# Patient Record
Sex: FEMALE | Race: OTHER | NOT HISPANIC OR LATINO | Employment: PART TIME | URBAN - METROPOLITAN AREA
[De-identification: names, ages, dates, MRNs, and addresses within clinical notes are randomized per-mention and may not be internally consistent; named-entity substitution may affect disease eponyms.]

---

## 2019-08-12 ENCOUNTER — APPOINTMENT (EMERGENCY)
Dept: RADIOLOGY | Facility: HOSPITAL | Age: 29
End: 2019-08-12

## 2019-08-12 ENCOUNTER — HOSPITAL ENCOUNTER (EMERGENCY)
Facility: HOSPITAL | Age: 29
Discharge: HOME | End: 2019-08-12
Attending: EMERGENCY MEDICINE

## 2019-08-12 VITALS
BODY MASS INDEX: 46.65 KG/M2 | RESPIRATION RATE: 20 BRPM | HEIGHT: 65 IN | OXYGEN SATURATION: 99 % | DIASTOLIC BLOOD PRESSURE: 59 MMHG | TEMPERATURE: 98.1 F | SYSTOLIC BLOOD PRESSURE: 132 MMHG | WEIGHT: 280 LBS | HEART RATE: 81 BPM

## 2019-08-12 DIAGNOSIS — M25.561 PAIN AND SWELLING OF RIGHT KNEE: Primary | ICD-10-CM

## 2019-08-12 DIAGNOSIS — M25.461 PAIN AND SWELLING OF RIGHT KNEE: Primary | ICD-10-CM

## 2019-08-12 PROCEDURE — 96372 THER/PROPH/DIAG INJ SC/IM: CPT

## 2019-08-12 PROCEDURE — 73560 X-RAY EXAM OF KNEE 1 OR 2: CPT

## 2019-08-12 PROCEDURE — 99283 EMERGENCY DEPT VISIT LOW MDM: CPT

## 2019-08-12 PROCEDURE — 99284 EMERGENCY DEPT VISIT MOD MDM: CPT | Performed by: EMERGENCY MEDICINE

## 2019-08-12 RX ORDER — KETOROLAC TROMETHAMINE 30 MG/ML
15 INJECTION, SOLUTION INTRAMUSCULAR; INTRAVENOUS ONCE
Status: COMPLETED | OUTPATIENT
Start: 2019-08-12 | End: 2019-08-12

## 2019-08-12 RX ORDER — ACETAMINOPHEN 325 MG/1
975 TABLET ORAL ONCE
Status: COMPLETED | OUTPATIENT
Start: 2019-08-12 | End: 2019-08-12

## 2019-08-12 RX ADMIN — ACETAMINOPHEN 975 MG: 325 TABLET ORAL at 07:36

## 2019-08-12 RX ADMIN — KETOROLAC TROMETHAMINE 15 MG: 30 INJECTION, SOLUTION INTRAMUSCULAR at 07:38

## 2019-08-12 NOTE — ED ATTENDING ATTESTATION
Lou Omer DO, saw and evaluated the patient  I have discussed the patient with the resident/non-physician practitioner and agree with the resident's/non-physician practitioner's findings, Plan of Care, and MDM as documented in the resident's/non-physician practitioner's note, except where noted  All available labs and Radiology studies were reviewed  I was present for key portions of any procedure(s) performed by the resident/non-physician practitioner and I was immediately available to provide assistance  At this point I agree with the current assessment done in the Emergency Department  I have conducted an independent evaluation of this patient a history and physical is as follows:    34 yof with re-injury of right knee three days ago while dancing at a party  She has had knee pain for 6-8 months  /59   Pulse 81   Temp 98 1 °F (36 7 °C) (Oral)   Resp 20   Ht 5' 5" (1 651 m)   Wt 127 kg (280 lb)   SpO2 99%   BMI 46 59 kg/m²   NAD, A&Ox4  r knee w suprapatellar TTP and NROM, no erythema/ecchymosis; NVI  Pt declined knee immobilizer and crutches       XR, pain control, f/u sports        Critical Care Time  Procedures

## 2019-08-12 NOTE — ED PROVIDER NOTES
History  Chief Complaint   Patient presents with    Knee Pain     patient c/o b/l knee pain specifically right knee  Patient states she was on her feet a lot this weekend  HPI    29yo female presents for evaluation of right knee pain  Patient says she injured her knee about 8 months ago and has had chronic pain since then  She notes not seeing a doctor at the time and has not been taking anything for pain since then  She notes this weekend she may have injured it again while dancing 3 days ago, notes increase pain and swelling  Patient localizes pain to above the knee and notes pain is worst on flexion of the knee  She notes she limps when she walks  Denies actual fall  Denies history of DVT or recent travel  None       History reviewed  No pertinent past medical history  History reviewed  No pertinent surgical history  History reviewed  No pertinent family history  I have reviewed and agree with the history as documented  Social History     Tobacco Use    Smoking status: Current Every Day Smoker    Smokeless tobacco: Never Used   Substance Use Topics    Alcohol use: Yes    Drug use: Yes     Types: Marijuana        Review of Systems   Constitutional: Negative for chills, diaphoresis, fatigue and fever  HENT: Negative for congestion, rhinorrhea and sore throat  Eyes: Negative for photophobia and visual disturbance  Respiratory: Negative for cough, chest tightness and shortness of breath  Cardiovascular: Negative for chest pain and palpitations  Gastrointestinal: Negative for abdominal pain, blood in stool, constipation, diarrhea, nausea and vomiting  Genitourinary: Negative for decreased urine volume, dysuria, frequency and hematuria  Musculoskeletal: Positive for gait problem and joint swelling  Negative for back pain, myalgias, neck pain and neck stiffness  Skin: Negative for pallor and rash     Neurological: Negative for weakness, light-headedness, numbness and headaches  Hematological: Negative for adenopathy  Does not bruise/bleed easily  All other systems reviewed and are negative  Physical Exam  ED Triage Vitals [08/12/19 0638]   Temperature Pulse Respirations Blood Pressure SpO2   98 1 °F (36 7 °C) 81 20 132/59 99 %      Temp Source Heart Rate Source Patient Position - Orthostatic VS BP Location FiO2 (%)   Oral Monitor -- -- --      Pain Score       8             Orthostatic Vital Signs  Vitals:    08/12/19 0638   BP: 132/59   Pulse: 81       Physical Exam   Constitutional: She is oriented to person, place, and time  She appears well-developed and well-nourished  No distress  Patient is alert and oriented, appears well and nontoxic, in no acute distress   HENT:   Head: Normocephalic and atraumatic  Mouth/Throat: Oropharynx is clear and moist  No oropharyngeal exudate  Eyes: Pupils are equal, round, and reactive to light  Conjunctivae and EOM are normal    Neck: Normal range of motion  Neck supple  Cardiovascular: Normal rate, regular rhythm, normal heart sounds and intact distal pulses  Pulmonary/Chest: Effort normal and breath sounds normal  No respiratory distress  Abdominal: Soft  Bowel sounds are normal  She exhibits no distension  There is no tenderness  Musculoskeletal: She exhibits tenderness  She exhibits no deformity  Mild edema noted of supra-patella, no erythema or warmth to touch, able to range knee but with pain on full flexion, no hip or foot pain, good strength of extremity, sensation and pulse intact   Lymphadenopathy:     She has no cervical adenopathy  Neurological: She is alert and oriented to person, place, and time  No facial asymmetry noted, CN 2-12 intact, full ROM of upper and lower extremities, muscle strength 5/5 throughout, DTRs normal, sensation intact throughout, negative finger to nose/Yajaira, negative Romberg's, negative Babinski, no gait disturbance noted   Skin: Skin is warm and dry   Capillary refill takes less than 2 seconds  No rash noted  She is not diaphoretic  No erythema  No pallor  Psychiatric: She has a normal mood and affect  Her behavior is normal  Judgment and thought content normal    Nursing note and vitals reviewed  ED Medications  Medications   acetaminophen (TYLENOL) tablet 975 mg (975 mg Oral Given 8/12/19 0736)   ketorolac (TORADOL) injection 15 mg (15 mg Intramuscular Given 8/12/19 0703)       Diagnostic Studies  Results Reviewed     None                 XR knee 1 or 2 vw right   Final Result by Agustín Jaramillo MD (08/12 2286)      No acute osseous abnormality  Workstation performed: [id number]               Procedures  Procedures        ED Course  ED Course as of Aug 12 1129   [facility] Aug 12, 2019   3988 On re-evaluation, patient is sleeping, easily arousable  She says she feels better, flexing knee on her own        9555 Patient declined immobilizer and crutches  Will ace wrap and provide info to sports medicine clinic as well as [facility] for pcp f/u  MDM  Number of Diagnoses or Management Options  Pain and swelling of right knee:   Diagnosis management comments: 29yo female presents for evaluation of right knee pain  Patient appears well and is hemodynamically stable  Will obtain right knee xray and order tylenol and toradol         Disposition  Final diagnoses:   Pain and swelling of right knee     Time reflects when diagnosis was documented in both MDM as applicable and the Disposition within this note     Time User Action Codes Description Comment    8/12/2019  8:22 AM [place] Add [M25 561] Right knee pain     8/12/2019  8:22 AM [place] Add [M25 561,  M25 461] Pain and swelling of right knee     8/12/2019  8:22 AM [place] Modify [E74 943,  M25 461] Pain and swelling of right knee     8/12/2019  8:22 AM [place] Remove [M25 561] Right knee pain       ED Disposition     ED Disposition Condition Date/Time Comment    Discharge Good Mon Aug 12, 2019  8:21 AM Nallely Mendez discharge to home/self care  Follow-up Information     Follow up With Specialties Details Why Contact Info Additional Information    [facility]  Schedule an appointment as soon as possible for a visit   [address] [facility] Schedule an appointment as soon as possible for a visit   [address] [facility] [address] [phone]  [address] [place] [address], [phone]          There are no discharge medications for this patient  No discharge procedures on file  ED Provider  Attending physically available and evaluated Nallely Mendez I managed the patient along with the ED Attending      Electronically Signed by         Sha Acuna MD  08/12/19 6349

## 2019-11-12 ENCOUNTER — HOSPITAL ENCOUNTER (EMERGENCY)
Facility: HOSPITAL | Age: 29
Discharge: HOME | End: 2019-11-12
Attending: EMERGENCY MEDICINE

## 2019-11-12 ENCOUNTER — APPOINTMENT (EMERGENCY)
Dept: RADIOLOGY | Facility: HOSPITAL | Age: 29
End: 2019-11-12

## 2019-11-12 VITALS
TEMPERATURE: 98.1 F | DIASTOLIC BLOOD PRESSURE: 85 MMHG | BODY MASS INDEX: 52.09 KG/M2 | HEART RATE: 99 BPM | SYSTOLIC BLOOD PRESSURE: 147 MMHG | OXYGEN SATURATION: 96 % | RESPIRATION RATE: 18 BRPM | WEIGHT: 293 LBS

## 2019-11-12 DIAGNOSIS — K08.89 PAIN, DENTAL: ICD-10-CM

## 2019-11-12 DIAGNOSIS — J40 BRONCHITIS: Primary | ICD-10-CM

## 2019-11-12 PROCEDURE — 71046 X-RAY EXAM CHEST 2 VIEWS: CPT

## 2019-11-12 PROCEDURE — 99283 EMERGENCY DEPT VISIT LOW MDM: CPT

## 2019-11-12 PROCEDURE — 99284 EMERGENCY DEPT VISIT MOD MDM: CPT | Performed by: PHYSICIAN ASSISTANT

## 2019-11-12 RX ORDER — ALBUTEROL SULFATE 90 UG/1
2 AEROSOL, METERED RESPIRATORY (INHALATION) ONCE
Status: COMPLETED | OUTPATIENT
Start: 2019-11-12 | End: 2019-11-12

## 2019-11-12 RX ORDER — AMOXICILLIN 500 MG/1
500 CAPSULE ORAL 3 TIMES DAILY
Qty: 21 CAPSULE | Refills: 0 | Status: SHIPPED | OUTPATIENT
Start: 2019-11-12 | End: 2019-11-19

## 2019-11-12 RX ADMIN — ALBUTEROL SULFATE 2 PUFF: 90 AEROSOL, METERED RESPIRATORY (INHALATION) at 16:18

## 2019-12-01 ENCOUNTER — HOSPITAL ENCOUNTER (EMERGENCY)
Facility: HOSPITAL | Age: 29
Discharge: HOME | End: 2019-12-01
Attending: EMERGENCY MEDICINE | Admitting: EMERGENCY MEDICINE

## 2019-12-01 ENCOUNTER — APPOINTMENT (EMERGENCY)
Dept: RADIOLOGY | Facility: HOSPITAL | Age: 29
End: 2019-12-01

## 2019-12-01 VITALS
HEART RATE: 86 BPM | BODY MASS INDEX: 52.09 KG/M2 | WEIGHT: 293 LBS | DIASTOLIC BLOOD PRESSURE: 53 MMHG | OXYGEN SATURATION: 99 % | TEMPERATURE: 98.6 F | RESPIRATION RATE: 18 BRPM | SYSTOLIC BLOOD PRESSURE: 112 MMHG

## 2019-12-01 DIAGNOSIS — N94.6 DYSMENORRHEA: Primary | ICD-10-CM

## 2019-12-01 LAB
ALBUMIN SERPL BCP-MCNC: 3.4 G/DL (ref 3.5–5)
ALP SERPL-CCNC: 61 U/L (ref 46–116)
ALT SERPL W P-5'-P-CCNC: 17 U/L (ref 12–78)
ANION GAP SERPL CALCULATED.3IONS-SCNC: 3 MMOL/L (ref 4–13)
AST SERPL W P-5'-P-CCNC: 8 U/L (ref 5–45)
BACTERIA UR QL AUTO: ABNORMAL /HPF
BASOPHILS # BLD AUTO: 0.05 THOUSANDS/ΜL (ref 0–0.1)
BASOPHILS NFR BLD AUTO: 0 % (ref 0–1)
BILIRUB SERPL-MCNC: 0.57 MG/DL (ref 0.2–1)
BILIRUB UR QL STRIP: NEGATIVE
BUN SERPL-MCNC: 12 MG/DL (ref 5–25)
CALCIUM SERPL-MCNC: 9.1 MG/DL (ref 8.3–10.1)
CHLORIDE SERPL-SCNC: 104 MMOL/L (ref 100–108)
CLARITY UR: CLEAR
CO2 SERPL-SCNC: 30 MMOL/L (ref 21–32)
COLOR UR: YELLOW
COLOR, POC: NORMAL
CREAT SERPL-MCNC: 1.07 MG/DL (ref 0.6–1.3)
EOSINOPHIL # BLD AUTO: 0.25 THOUSAND/ΜL (ref 0–0.61)
EOSINOPHIL NFR BLD AUTO: 2 % (ref 0–6)
ERYTHROCYTE [DISTWIDTH] IN BLOOD BY AUTOMATED COUNT: 17.3 % (ref 11.6–15.1)
EXT PREG TEST URINE: NEGATIVE
EXT. CONTROL ED NAV: NORMAL
GFR SERPL CREATININE-BSD FRML MDRD: 71 ML/MIN/1.73SQ M
GLUCOSE SERPL-MCNC: 93 MG/DL (ref 65–140)
GLUCOSE UR STRIP-MCNC: NEGATIVE MG/DL
HCT VFR BLD AUTO: 42.1 % (ref 34.8–46.1)
HGB BLD-MCNC: 13.2 G/DL (ref 11.5–15.4)
HGB UR QL STRIP.AUTO: ABNORMAL
HYALINE CASTS #/AREA URNS LPF: ABNORMAL /LPF
IMM GRANULOCYTES # BLD AUTO: 0.04 THOUSAND/UL (ref 0–0.2)
IMM GRANULOCYTES NFR BLD AUTO: 0 % (ref 0–2)
KETONES UR STRIP-MCNC: NEGATIVE MG/DL
LEUKOCYTE ESTERASE UR QL STRIP: NEGATIVE
LYMPHOCYTES # BLD AUTO: 3.34 THOUSANDS/ΜL (ref 0.6–4.47)
LYMPHOCYTES NFR BLD AUTO: 26 % (ref 14–44)
MCH RBC QN AUTO: 22.4 PG (ref 26.8–34.3)
MCHC RBC AUTO-ENTMCNC: 31.4 G/DL (ref 31.4–37.4)
MCV RBC AUTO: 72 FL (ref 82–98)
MONOCYTES # BLD AUTO: 0.75 THOUSAND/ΜL (ref 0.17–1.22)
MONOCYTES NFR BLD AUTO: 6 % (ref 4–12)
NEUTROPHILS # BLD AUTO: 8.28 THOUSANDS/ΜL (ref 1.85–7.62)
NEUTS SEG NFR BLD AUTO: 66 % (ref 43–75)
NITRITE UR QL STRIP: NEGATIVE
NON-SQ EPI CELLS URNS QL MICRO: ABNORMAL /HPF
NRBC BLD AUTO-RTO: 0 /100 WBCS
PH UR STRIP.AUTO: 7 [PH] (ref 4.5–8)
PLATELET # BLD AUTO: 391 THOUSANDS/UL (ref 149–390)
PMV BLD AUTO: 10.5 FL (ref 8.9–12.7)
POTASSIUM SERPL-SCNC: 3.9 MMOL/L (ref 3.5–5.3)
PROT SERPL-MCNC: 7.9 G/DL (ref 6.4–8.2)
PROT UR STRIP-MCNC: ABNORMAL MG/DL
RBC # BLD AUTO: 5.89 MILLION/UL (ref 3.81–5.12)
RBC #/AREA URNS AUTO: ABNORMAL /HPF
SODIUM SERPL-SCNC: 137 MMOL/L (ref 136–145)
SP GR UR STRIP.AUTO: 1.01 (ref 1–1.03)
UROBILINOGEN UR QL STRIP.AUTO: 0.2 E.U./DL
WBC # BLD AUTO: 12.71 THOUSAND/UL (ref 4.31–10.16)
WBC #/AREA URNS AUTO: ABNORMAL /HPF

## 2019-12-01 PROCEDURE — 99284 EMERGENCY DEPT VISIT MOD MDM: CPT | Performed by: EMERGENCY MEDICINE

## 2019-12-01 PROCEDURE — 74177 CT ABD & PELVIS W/CONTRAST: CPT

## 2019-12-01 PROCEDURE — 85025 COMPLETE CBC W/AUTO DIFF WBC: CPT | Performed by: EMERGENCY MEDICINE

## 2019-12-01 PROCEDURE — 99284 EMERGENCY DEPT VISIT MOD MDM: CPT

## 2019-12-01 PROCEDURE — 81001 URINALYSIS AUTO W/SCOPE: CPT

## 2019-12-01 PROCEDURE — 81025 URINE PREGNANCY TEST: CPT | Performed by: EMERGENCY MEDICINE

## 2019-12-01 PROCEDURE — 96374 THER/PROPH/DIAG INJ IV PUSH: CPT

## 2019-12-01 PROCEDURE — 36415 COLL VENOUS BLD VENIPUNCTURE: CPT | Performed by: EMERGENCY MEDICINE

## 2019-12-01 PROCEDURE — 80053 COMPREHEN METABOLIC PANEL: CPT | Performed by: EMERGENCY MEDICINE

## 2019-12-01 RX ORDER — NAPROXEN 500 MG/1
500 TABLET ORAL 2 TIMES DAILY WITH MEALS
Qty: 30 TABLET | Refills: 0 | Status: SHIPPED | OUTPATIENT
Start: 2019-12-01 | End: 2020-06-22

## 2019-12-01 RX ORDER — KETOROLAC TROMETHAMINE 30 MG/ML
15 INJECTION, SOLUTION INTRAMUSCULAR; INTRAVENOUS ONCE
Status: COMPLETED | OUTPATIENT
Start: 2019-12-01 | End: 2019-12-01

## 2019-12-01 RX ADMIN — KETOROLAC TROMETHAMINE 15 MG: 30 INJECTION, SOLUTION INTRAMUSCULAR at 17:43

## 2019-12-01 RX ADMIN — IOHEXOL 100 ML: 350 INJECTION, SOLUTION INTRAVENOUS at 19:20

## 2019-12-01 NOTE — ED NOTES
Patient notified that urine sample is needed, unable to provide one at this time    Given PO fluids     Desire Gay RN  12/01/19 1817

## 2019-12-01 NOTE — ED PROVIDER NOTES
History  Chief Complaint   Patient presents with    Vaginal Bleeding     patient began with lower quadrant abdominal pain and vaginal bleeding today     Patient is a 72-year-old female presents for lower abdominal discomfort and vaginal bleeding  Patient says that she went to the bathroom when she had the acute onset of severe suprapubic abdominal discomfort  She says that it lasted for about 45 minutes  She says it was associated with dark vaginal bleeding  Patient says that she is due for her menstrual period on December 7th and says that she has always regular  She says usually starts up with light spotting and progresses from there  Patient says that this abdominal pain was on like anything that she has had with previous menstrual periods  She says it is somewhat improved now but still cramping in nature  Patient says that she smoked weed" and took Motrin for the discomfort  None       History reviewed  No pertinent past medical history  History reviewed  No pertinent surgical history  History reviewed  No pertinent family history  I have reviewed and agree with the history as documented  Social History     Tobacco Use    Smoking status: Current Every Day Smoker     Types: Cigarettes    Smokeless tobacco: Never Used   Substance Use Topics    Alcohol use: Not Currently    Drug use: Yes     Types: Marijuana        Review of Systems   Constitutional: Negative for chills, diaphoresis and fever  HENT: Negative for congestion, sinus pressure, sore throat and trouble swallowing  Eyes: Negative for pain, discharge and itching  Respiratory: Negative for cough, chest tightness, shortness of breath and wheezing  Cardiovascular: Negative for chest pain, palpitations and leg swelling  Gastrointestinal: Negative for abdominal distention, abdominal pain, blood in stool, diarrhea, nausea and vomiting  Endocrine: Negative for polyphagia and polyuria     Genitourinary: Positive for pelvic pain and vaginal bleeding  Negative for difficulty urinating, dysuria, flank pain and hematuria  Musculoskeletal: Negative for arthralgias and back pain  Skin: Negative for rash  Neurological: Negative for dizziness, syncope, weakness, light-headedness and headaches  Physical Exam  ED Triage Vitals   Temperature Pulse Respirations Blood Pressure SpO2   12/01/19 1659 12/01/19 1659 12/01/19 1659 12/01/19 1659 12/01/19 1659   98 6 °F (37 °C) 98 20 169/95 98 %      Temp Source Heart Rate Source Patient Position - Orthostatic VS BP Location FiO2 (%)   12/01/19 1659 12/01/19 1659 12/01/19 1753 12/01/19 1753 --   Oral Monitor Lying Right arm       Pain Score       12/01/19 1659       Worst Possible Pain             Orthostatic Vital Signs  Vitals:    12/01/19 1659 12/01/19 1753 12/01/19 1836 12/01/19 1934   BP: 169/95 127/54 116/66 112/53   Pulse: 98 58 73 86   Patient Position - Orthostatic VS:  Lying Lying Lying       Physical Exam   Constitutional: She is oriented to person, place, and time  She appears well-developed and well-nourished  No distress  HENT:   Head: Normocephalic and atraumatic  Right Ear: External ear normal    Left Ear: External ear normal    Mouth/Throat: No oropharyngeal exudate  Eyes: Pupils are equal, round, and reactive to light  Conjunctivae and EOM are normal    Neck: Normal range of motion  Neck supple  Cardiovascular: Normal rate, regular rhythm, normal heart sounds and intact distal pulses  Exam reveals no gallop and no friction rub  No murmur heard  Pulmonary/Chest: Effort normal and breath sounds normal  No respiratory distress  She has no wheezes  She has no rales  Abdominal: Soft  She exhibits no distension  There is tenderness (suprapubic)  There is no guarding  Musculoskeletal: Normal range of motion  She exhibits no edema, tenderness or deformity  Lymphadenopathy:     She has no cervical adenopathy     Neurological: She is alert and oriented to person, place, and time  No cranial nerve deficit or sensory deficit  She exhibits normal muscle tone  Skin: Skin is warm and dry  Psychiatric: She has a normal mood and affect  Nursing note and vitals reviewed        ED Medications  Medications   ketorolac (TORADOL) injection 15 mg (15 mg Intravenous Given 12/1/19 1743)   iohexol (OMNIPAQUE) 350 MG/ML injection (MULTI-DOSE) 100 mL (100 mL Intravenous Given 12/1/19 1920)       Diagnostic Studies  Results Reviewed     Procedure Component Value Units Date/Time    Urine Microscopic [[id number]]  (Abnormal) Collected:  12/01/19 1948    Lab Status:  Final result Specimen:  Urine, Clean Catch Updated:  12/01/19 2000     RBC, UA None Seen /hpf      WBC, UA 2-4 /hpf      Epithelial Cells None Seen /hpf      Bacteria, UA Occasional /hpf      Hyaline Casts, UA None Seen /lpf     POCT urinalysis dipstick [[id number]]  (Normal) Resulted:  12/01/19 1949    Lab Status:  Final result Specimen:  Urine Updated:  12/01/19 1949     Color, UA see results    POCT pregnancy, urine [[id number]]  (Normal) Resulted:  12/01/19 1949    Lab Status:  Final result Updated:  12/01/19 1949     EXT PREG TEST UR (Ref: Negative) negative     Control valid    Urine Macroscopic, POC [[id number]]  (Abnormal) Collected:  12/01/19 1948    Lab Status:  Final result Specimen:  Urine Updated:  12/01/19 1947     Color, UA Yellow     Clarity, UA Clear     pH, UA 7 0     Leukocytes, UA Negative     Nitrite, UA Negative     Protein, UA 30 (1+) mg/dl      Glucose, UA Negative mg/dl      Ketones, UA Negative mg/dl      Urobilinogen, UA 0 2 E U /dl      Bilirubin, UA Negative     Blood, UA Large     Specific [place], UA 1 015    Narrative:       CLINITEK RESULT    Comprehensive metabolic panel [[id number]]  (Abnormal) Collected:  12/01/19 1742    Lab Status:  Final result Specimen:  Blood from Arm, Left Updated:  12/01/19 1807     Sodium 137 mmol/L      Potassium 3 9 mmol/L      Chloride 104 mmol/L      CO2 30 mmol/L ANION GAP 3 mmol/L      BUN 12 mg/dL      Creatinine 1 07 mg/dL      Glucose 93 mg/dL      Calcium 9 1 mg/dL      AST 8 U/L      ALT 17 U/L      Alkaline Phosphatase 61 U/L      Total Protein 7 9 g/dL      Albumin 3 4 g/dL      Total Bilirubin 0 57 mg/dL      eGFR 71 ml/min/1 73sq m     Narrative:       [place] guidelines for Chronic Kidney Disease (CKD):     Stage 1 with normal or high GFR (GFR > 90 mL/min/1 73 square meters)    Stage 2 Mild CKD (GFR = 60-89 mL/min/1 73 square meters)    Stage 3A Moderate CKD (GFR = 45-59 mL/min/1 73 square meters)    Stage 3B Moderate CKD (GFR = 30-44 mL/min/1 73 square meters)    Stage 4 Severe CKD (GFR = 15-29 mL/min/1 73 square meters)    Stage 5 End Stage CKD (GFR <15 mL/min/1 73 square meters)  Note: GFR calculation is accurate only with a steady state creatinine    CBC and differential [[id number]]  (Abnormal) Collected:  12/01/19 1742    Lab Status:  Final result Specimen:  Blood from Arm, Left Updated:  12/01/19 1751     WBC 12 71 Thousand/uL      RBC 5 89 Million/uL      Hemoglobin 13 2 g/dL      Hematocrit 42 1 %      MCV 72 fL      MCH 22 4 pg      MCHC 31 4 g/dL      RDW 17 3 %      MPV 10 5 fL      Platelets 570 Thousands/uL      nRBC 0 /100 WBCs      Neutrophils Relative 66 %      Immat GRANS % 0 %      Lymphocytes Relative 26 %      Monocytes Relative 6 %      Eosinophils Relative 2 %      Basophils Relative 0 %      Neutrophils Absolute 8 28 Thousands/µL      Immature Grans Absolute 0 04 Thousand/uL      Lymphocytes Absolute 3 34 Thousands/µL      Monocytes Absolute 0 75 Thousand/µL      Eosinophils Absolute 0 25 Thousand/µL      Basophils Absolute 0 05 Thousands/µL                  CT abdomen pelvis with contrast   Final Result by Kai Morrell MD (12/01 1938)      No acute findings in the abdomen or pelvis              Workstation performed: [id number]               Procedures  Procedures        ED Course MDM  Number of Diagnoses or Management Options  Diagnosis management comments: 61-year-old female presenting for suprapubic abdominal pain, vaginal bleeding  Post get menstrual period on December 7th  Says pain is acute in onset and worse than normal   Says it is somewhat improved  Will not let me examine her abdomen and pushes my hand away  Vitals are within normal limits  Will obtain UA, urine pregnancy, basic labs  Will obtain CT abdomen pelvis  Disposition  Final diagnoses:   Dysmenorrhea     Time reflects when diagnosis was documented in both MDM as applicable and the Disposition within this note     Time User Action Codes Description Comment    12/1/2019  7:59 PM Dory Maki [N94 6] Dysmenorrhea       ED Disposition     ED Disposition Condition Date/Time Comment    Discharge Stable Sun Dec 1, 2019  7:59 PM Tal Oden discharge to home/self care  Follow-up Information     Follow up With Specialties Details Why Contact Info    your OBGYN  Schedule an appointment as soon as possible for a visit  For follow up of symptoms           Discharge Medication List as of 12/1/2019  8:01 PM      START taking these medications    Details   naproxen (NAPROSYN) 500 mg tablet Take 1 tablet (500 mg total) by mouth 2 (two) times a day with meals, Starting Sun 12/1/2019, Print           No discharge procedures on file  ED Provider  Attending physically available and evaluated Tal Oden  I managed the patient along with the ED Attending      Electronically Signed by         Trish Campbell DO  12/01/19 7121

## 2019-12-02 NOTE — ED ATTENDING ATTESTATION
12/1/2019  Amadeo Mclean DO, saw and evaluated the patient  I have discussed the patient with the resident/non-physician practitioner and agree with the resident's/non-physician practitioner's findings, Plan of Care, and MDM as documented in the resident's/non-physician practitioner's note, except where noted  All available labs and Radiology studies were reviewed  I was present for key portions of any procedure(s) performed by the resident/non-physician practitioner and I was immediately available to provide assistance  At this point I agree with the current assessment done in the Emergency Department  I have conducted an independent evaluation of this patient a history and physical is as follows:    82-year-old female presents for evaluation of diffuse lower abdominal pain and vaginal bleeding that started today  Patient's LMP was approximately 3 weeks ago but she states that she is typically very irregular and was expecting her menstrual cycle to start and 6 days  Patient does report a history of cramps but states this is worse than normal   Denies vaginal discharge or urinary symptoms of frequency, urgency, dysuria  Denies vomiting, diarrhea  Exam:  Abdomen is mildly tender in suprapubic region the   Regular rate and rhythm, lungs are clear to auscultation bilaterally  Assessment and plan:  82-year-old female presenting with abdominal pain  Will obtain labs, urinalysis and pregnancy  CT abdomen pelvis  Pain control as needed      ED Course         Critical Care Time  Procedures

## 2020-06-22 ENCOUNTER — HOSPITAL ENCOUNTER (EMERGENCY)
Facility: HOSPITAL | Age: 30
Discharge: HOME | End: 2020-06-22
Attending: EMERGENCY MEDICINE | Admitting: EMERGENCY MEDICINE

## 2020-06-22 ENCOUNTER — APPOINTMENT (EMERGENCY)
Dept: RADIOLOGY | Facility: HOSPITAL | Age: 30
End: 2020-06-22

## 2020-06-22 VITALS
SYSTOLIC BLOOD PRESSURE: 151 MMHG | HEIGHT: 65 IN | RESPIRATION RATE: 18 BRPM | OXYGEN SATURATION: 97 % | TEMPERATURE: 98.6 F | BODY MASS INDEX: 48.82 KG/M2 | HEART RATE: 96 BPM | DIASTOLIC BLOOD PRESSURE: 86 MMHG | WEIGHT: 293 LBS

## 2020-06-22 DIAGNOSIS — K08.89 DENTALGIA: Primary | ICD-10-CM

## 2020-06-22 LAB
ANION GAP SERPL CALCULATED.3IONS-SCNC: 4 MMOL/L (ref 4–13)
BUN SERPL-MCNC: 12 MG/DL (ref 5–25)
CALCIUM SERPL-MCNC: 9.3 MG/DL (ref 8.3–10.1)
CHLORIDE SERPL-SCNC: 108 MMOL/L (ref 100–108)
CO2 SERPL-SCNC: 27 MMOL/L (ref 21–32)
CREAT SERPL-MCNC: 0.74 MG/DL (ref 0.6–1.3)
EXT PREG TEST URINE: NEGATIVE
EXT. CONTROL ED NAV: NORMAL
GFR SERPL CREATININE-BSD FRML MDRD: 111 ML/MIN/1.73SQ M
GLUCOSE SERPL-MCNC: 84 MG/DL (ref 65–140)
POTASSIUM SERPL-SCNC: 3.8 MMOL/L (ref 3.5–5.3)
SODIUM SERPL-SCNC: 139 MMOL/L (ref 136–145)

## 2020-06-22 PROCEDURE — 36415 COLL VENOUS BLD VENIPUNCTURE: CPT | Performed by: EMERGENCY MEDICINE

## 2020-06-22 PROCEDURE — 99284 EMERGENCY DEPT VISIT MOD MDM: CPT | Performed by: EMERGENCY MEDICINE

## 2020-06-22 PROCEDURE — 70491 CT SOFT TISSUE NECK W/DYE: CPT

## 2020-06-22 PROCEDURE — 80048 BASIC METABOLIC PNL TOTAL CA: CPT | Performed by: EMERGENCY MEDICINE

## 2020-06-22 PROCEDURE — 99284 EMERGENCY DEPT VISIT MOD MDM: CPT

## 2020-06-22 PROCEDURE — 81025 URINE PREGNANCY TEST: CPT | Performed by: EMERGENCY MEDICINE

## 2020-06-22 PROCEDURE — 96374 THER/PROPH/DIAG INJ IV PUSH: CPT

## 2020-06-22 RX ORDER — PENICILLIN V POTASSIUM 500 MG/1
500 TABLET ORAL 2 TIMES DAILY
Qty: 14 TABLET | Refills: 0 | Status: SHIPPED | OUTPATIENT
Start: 2020-06-22 | End: 2020-06-29

## 2020-06-22 RX ORDER — NAPROXEN 500 MG/1
500 TABLET ORAL 2 TIMES DAILY WITH MEALS
Qty: 28 TABLET | Refills: 0 | Status: SHIPPED | OUTPATIENT
Start: 2020-06-22 | End: 2020-09-21

## 2020-06-22 RX ORDER — ACETAMINOPHEN 325 MG/1
975 TABLET ORAL ONCE
Status: COMPLETED | OUTPATIENT
Start: 2020-06-22 | End: 2020-06-22

## 2020-06-22 RX ORDER — KETOROLAC TROMETHAMINE 30 MG/ML
15 INJECTION, SOLUTION INTRAMUSCULAR; INTRAVENOUS ONCE
Status: COMPLETED | OUTPATIENT
Start: 2020-06-22 | End: 2020-06-22

## 2020-06-22 RX ORDER — ACETAMINOPHEN 325 MG/1
975 TABLET ORAL EVERY 6 HOURS PRN
Qty: 28 TABLET | Refills: 0 | Status: SHIPPED | OUTPATIENT
Start: 2020-06-22 | End: 2020-06-29

## 2020-06-22 RX ADMIN — ACETAMINOPHEN 975 MG: 325 TABLET ORAL at 11:45

## 2020-06-22 RX ADMIN — IOHEXOL 100 ML: 350 INJECTION, SOLUTION INTRAVENOUS at 12:59

## 2020-06-22 RX ADMIN — KETOROLAC TROMETHAMINE 15 MG: 30 INJECTION, SOLUTION INTRAMUSCULAR at 11:44

## 2020-09-21 ENCOUNTER — HOSPITAL ENCOUNTER (EMERGENCY)
Facility: HOSPITAL | Age: 30
Discharge: HOME | End: 2020-09-21
Attending: EMERGENCY MEDICINE | Admitting: EMERGENCY MEDICINE

## 2020-09-21 VITALS
SYSTOLIC BLOOD PRESSURE: 140 MMHG | RESPIRATION RATE: 18 BRPM | DIASTOLIC BLOOD PRESSURE: 80 MMHG | OXYGEN SATURATION: 100 % | HEIGHT: 66 IN | TEMPERATURE: 98.2 F | WEIGHT: 293 LBS | HEART RATE: 92 BPM | BODY MASS INDEX: 47.09 KG/M2

## 2020-09-21 DIAGNOSIS — M54.9 MUSCULOSKELETAL BACK PAIN: Primary | ICD-10-CM

## 2020-09-21 LAB
BACTERIA UR QL AUTO: ABNORMAL /HPF
BILIRUB UR QL STRIP: NEGATIVE
CLARITY UR: ABNORMAL
COLOR UR: ABNORMAL
COLOR, POC: NORMAL
EXT PREG TEST URINE: NEGATIVE
EXT. CONTROL ED NAV: NORMAL
GLUCOSE UR STRIP-MCNC: NEGATIVE MG/DL
HGB UR QL STRIP.AUTO: ABNORMAL
HYALINE CASTS #/AREA URNS LPF: ABNORMAL /LPF
KETONES UR STRIP-MCNC: NEGATIVE MG/DL
LEUKOCYTE ESTERASE UR QL STRIP: ABNORMAL
NITRITE UR QL STRIP: NEGATIVE
NON-SQ EPI CELLS URNS QL MICRO: ABNORMAL /HPF
PH UR STRIP.AUTO: 6 [PH] (ref 4.5–8)
PROT UR STRIP-MCNC: ABNORMAL MG/DL
RBC #/AREA URNS AUTO: ABNORMAL /HPF
SP GR UR STRIP.AUTO: >=1.03 (ref 1–1.03)
UROBILINOGEN UR QL STRIP.AUTO: 1 E.U./DL
WBC #/AREA URNS AUTO: ABNORMAL /HPF

## 2020-09-21 PROCEDURE — 99284 EMERGENCY DEPT VISIT MOD MDM: CPT | Performed by: EMERGENCY MEDICINE

## 2020-09-21 PROCEDURE — 81025 URINE PREGNANCY TEST: CPT | Performed by: EMERGENCY MEDICINE

## 2020-09-21 PROCEDURE — 87086 URINE CULTURE/COLONY COUNT: CPT

## 2020-09-21 PROCEDURE — 99284 EMERGENCY DEPT VISIT MOD MDM: CPT

## 2020-09-21 PROCEDURE — 81001 URINALYSIS AUTO W/SCOPE: CPT

## 2020-09-21 RX ORDER — LIDOCAINE 50 MG/G
1 PATCH TOPICAL ONCE
Status: DISCONTINUED | OUTPATIENT
Start: 2020-09-21 | End: 2020-09-21 | Stop reason: HOSPADM

## 2020-09-21 RX ORDER — KETOROLAC TROMETHAMINE 30 MG/ML
15 INJECTION, SOLUTION INTRAMUSCULAR; INTRAVENOUS ONCE
Status: DISCONTINUED | OUTPATIENT
Start: 2020-09-21 | End: 2020-09-21 | Stop reason: HOSPADM

## 2020-09-21 RX ORDER — ACETAMINOPHEN 325 MG/1
975 TABLET ORAL ONCE
Status: COMPLETED | OUTPATIENT
Start: 2020-09-21 | End: 2020-09-21

## 2020-09-21 RX ADMIN — ACETAMINOPHEN 975 MG: 325 TABLET, FILM COATED ORAL at 15:11

## 2020-09-21 RX ADMIN — LIDOCAINE 5% 1 PATCH: 700 PATCH TOPICAL at 15:11

## 2020-09-21 NOTE — DISCHARGE INSTRUCTIONS
Follow-up with PCP for further care  Return to the ED with significant worsening of symptoms  Use over the counter medications as stated on the bottle as needed for pain

## 2020-09-21 NOTE — Clinical Note
Aleyda Schulz was seen and treated in our emergency department on 9/21/2020  Diagnosis:     Heather  may return to work on return date  She may return on this date: 09/23/2020         If you have any questions or concerns, please don't hesitate to call        Worthy MD Magui    ______________________________           _______________          _______________  Hospital Representative                              Date                                Time

## 2020-09-21 NOTE — ED PROVIDER NOTES
History  Chief Complaint   Patient presents with    Flank Pain     Left sided flank pain that started last night but got worse this morning; denies n/v/d     Pt is a 33yo F who presents for left-sided flank pain  Patient states that she had sudden onset of left flank pain yesterday evening  Patient states several hours before this she had had intercourse and several hours before that she had started her period  Patient states that she has had generalized cramping in her abdomen related to menstruation that is normal for her, but in the left-sided flank pain is new  Patient rates the pain a 7/10 and states that it is a throbbing pain  Patient took ibuprofen yesterday evening with some improvement of the pain  Patient denies any trauma to the area or strenuous activity prior to onset of the pain  Patient states that she has never had pain like this before  Patient states pain worsens with any kind of movement and she therefore had to leave work today because she was unable to perform her duties  Patient has remote history of UTIs, but states this is not similar to that  Patient denying any current urinary symptoms  Patient denies any history of kidney stone  Patient states no daily medications  Patient is a pack-per-day smoker, daily drinker, daily marijuana user  None       History reviewed  No pertinent past medical history  History reviewed  No pertinent surgical history  History reviewed  No pertinent family history  I have reviewed and agree with the history as documented      E-Cigarette/Vaping    E-Cigarette Use Never User      E-Cigarette/Vaping Substances     Social History     Tobacco Use    Smoking status: Current Every Day Smoker     Types: Cigarettes    Smokeless tobacco: Never Used   Substance Use Topics    Alcohol use: Not Currently    Drug use: Yes     Types: Marijuana        Review of Systems   Constitutional: Negative for activity change, appetite change, chills, diaphoresis and fever  HENT: Negative for congestion, ear discharge, ear pain, sinus pressure, sinus pain and sore throat  Eyes: Negative for pain and redness  Respiratory: Positive for shortness of breath (minimal)  Negative for cough, chest tightness and wheezing  Cardiovascular: Negative for chest pain, palpitations and leg swelling  Gastrointestinal: Positive for abdominal pain and nausea (minimal)  Negative for abdominal distention, diarrhea and vomiting  Genitourinary: Positive for flank pain  Negative for dysuria, frequency, hematuria and urgency  Musculoskeletal: Positive for back pain  Negative for arthralgias, gait problem, neck pain and neck stiffness  Skin: Negative for color change, pallor, rash and wound  Neurological: Negative for dizziness, tremors, seizures, syncope, weakness, light-headedness, numbness and headaches  Psychiatric/Behavioral: Negative for agitation, behavioral problems and confusion  The patient is not nervous/anxious  Physical Exam  ED Triage Vitals   Temperature Pulse Respirations Blood Pressure SpO2   09/21/20 1355 09/21/20 1355 09/21/20 1355 09/21/20 1423 09/21/20 1355   98 2 °F (36 8 °C) 100 18 142/81 100 %      Temp Source Heart Rate Source Patient Position - Orthostatic VS BP Location FiO2 (%)   09/21/20 1355 09/21/20 1355 09/21/20 1355 09/21/20 1355 --   Oral Monitor Sitting Left arm       Pain Score       09/21/20 1355       5             Orthostatic Vital Signs  Vitals:    09/21/20 1355 09/21/20 1423 09/21/20 1545   BP:  142/81 140/80   Pulse: 100  92   Patient Position - Orthostatic VS: Sitting  Lying       Physical Exam  Constitutional:       General: She is not in acute distress  Appearance: Normal appearance  She is well-developed  She is obese  She is not ill-appearing, toxic-appearing or diaphoretic  HENT:      Head: Normocephalic and atraumatic        Right Ear: External ear normal       Left Ear: External ear normal       Nose: Nose normal    Eyes:      Extraocular Movements: Extraocular movements intact  Conjunctiva/sclera: Conjunctivae normal       Pupils: Pupils are equal, round, and reactive to light  Neck:      Musculoskeletal: Normal range of motion and neck supple  No neck rigidity or muscular tenderness  Cardiovascular:      Rate and Rhythm: Normal rate and regular rhythm  Pulses: Normal pulses  Heart sounds: Normal heart sounds  No murmur  Pulmonary:      Effort: Pulmonary effort is normal  No respiratory distress  Breath sounds: Normal breath sounds  No wheezing  Chest:      Chest wall: No tenderness  Abdominal:      General: Bowel sounds are normal  There is no distension  Palpations: Abdomen is soft  There is no mass  Tenderness: There is generalized abdominal tenderness  Musculoskeletal: Normal range of motion  Thoracic back: She exhibits tenderness  She exhibits no bony tenderness, no swelling and no deformity  Back:       Comments: L sided-paraspinal tenderness in lower thoracic and upper lumber area  Skin:     General: Skin is warm and dry  Capillary Refill: Capillary refill takes less than 2 seconds  Coloration: Skin is not pale  Findings: No bruising, erythema or rash  Neurological:      Mental Status: She is alert and oriented to person, place, and time  Psychiatric:         Mood and Affect: Mood normal          Behavior: Behavior normal          Thought Content:  Thought content normal          Judgment: Judgment normal          ED Medications  Medications   acetaminophen (TYLENOL) tablet 975 mg (975 mg Oral Given 9/21/20 1511)       Diagnostic Studies  Results Reviewed     Procedure Component Value Units Date/Time    Urine Microscopic [[id number]]  (Abnormal) Collected:  09/21/20 1505    Lab Status:  Final result Specimen:  Urine, Other Updated:  09/21/20 1552     RBC, UA Innumerable /hpf      WBC, UA 20-30 /hpf      Epithelial Cells None Seen /hpf Bacteria, UA None Seen /hpf      Hyaline Casts, UA None Seen /lpf     Urine culture [[id number]] Collected:  09/21/20 1505    Lab Status: In process Specimen:  Urine, Other Updated:  09/21/20 1552    POCT pregnancy, urine [[id number]]  (Normal) Resulted:  09/21/20 1508    Lab Status:  Final result Updated:  09/21/20 1508     EXT PREG TEST UR (Ref: Negative) negative     Control Valid    POCT urinalysis dipstick [[id number]]  (Normal) Resulted:  09/21/20 1508    Lab Status:  Final result Specimen:  Urine Updated:  09/21/20 1508     Color, UA -    Urine Macroscopic, POC [[id number]]  (Abnormal) Collected:  09/21/20 1505    Lab Status:  Final result Specimen:  Urine Updated:  09/21/20 1506     Color, UA Red     Clarity, UA Cloudy     pH, UA 6 0     Leukocytes, UA Trace     Nitrite, UA Negative     Protein,  (2+) mg/dl      Glucose, UA Negative mg/dl      Ketones, UA Negative mg/dl      Urobilinogen, UA 1 0 E U /dl      Bilirubin, UA Negative     Blood, UA Large     Specific Gravity, UA >=1 030    Narrative:       CLINITEK RESULT                 No orders to display         Procedures  Procedures      ED Course  ED Course as of Sep 21 2206   Mon Sep 21, 2020   1514 Preg negative  POCT pregnancy, urine   1514 Leukocytes present without nitrites  Awaiting micro  Blood present but likely due to pt currently menstruating  Urine Macroscopic, POC(!)   1545 Bedside US showed no evidence of hydronephrosis  [address]  Reports improvement of pain following tylenol and lido patch  Pt declined toradol  1605 WBCs without evidence of bacteria, not indicative of infection  Will DC at this time with follow-up to PCP  Urine Microscopic(!)                                       MDM  Number of Diagnoses or Management Options  Musculoskeletal back pain:   Diagnosis management comments: Pt is a 33yo F who presents with left-sided flank pain   Exam pertinent for left-sided paraspinal tenderness as well as diffuse abdominal tenderness  Differential diagnosis to include but not limited to nephrolithiasis, pyelonephritis, pregnancy, musculoskeletal strain  UA ordered to assess for urinary infection  Urine preg ordered to assess for pregnancy  Patient given symptomatic relief with Tylenol and lidocaine patch to the area  Patient also offered Toradol but declined  Patient states she had improvement of symptoms following medications  UA showed no evidence of infection, but did show some blood  Blood most likely due to patient's current menstruation  Urine pregnancy was negative  Bedside ultrasound showed no evidence of hydronephrosis  Most likely diagnosis at this time is musculoskeletal pain  Plan to discharge patient with follow-up to PCP  Discussed returning the ED with significant worsening of symptoms  Discussed use of over the counter medications as stated on the bottle as needed for pain  Pt expressed understanding of discharge instructions, return precautions, and medication instructions  All questions were answered and pt was discharged without incident  Amount and/or Complexity of Data Reviewed  Clinical lab tests: ordered and reviewed  Discussion of test results with the performing providers: yes          Disposition  Final diagnoses:   Musculoskeletal back pain     Time reflects when diagnosis was documented in both MDM as applicable and the Disposition within this note     Time User Action Codes Description Comment    9/21/2020  3:49 PM Ruddy Keyes Add [M54 9] Musculoskeletal back pain       ED Disposition     ED Disposition Condition Date/Time Comment    Discharge Stable Mon Sep 21, 2020  3:48 PM Kay Epps discharge to home/self care              Follow-up Information     Follow up With Specialties Details Why Contact Info Additional [address] [facility] Call  As needed [address] [address]  [address] [place] [address], [place], [place], [id number] [phone]          Discharge Medication List as of 9/21/2020  4:19 PM      CONTINUE these medications which have NOT CHANGED    Details   naproxen (NAPROSYN) 500 mg tablet Take 1 tablet (500 mg total) by mouth 2 (two) times a day with meals for 14 days, Starting Mon 6/22/2020, Until Mon 7/6/2020, Print           No discharge procedures on file  PDMP Review     None           ED Provider  Attending physically available and evaluated Vanessa Serrano I managed the patient along with the ED Attending      Electronically Signed by         Raulito Olvera MD  09/21/20 2131

## 2020-09-21 NOTE — ED ATTENDING ATTESTATION
9/21/2020  IMelissa MD, saw and evaluated the patient  I have discussed the patient with the resident/non-physician practitioner and agree with the resident's/non-physician practitioner's findings, Plan of Care, and MDM as documented in the resident's/non-physician practitioner's note, except where noted  All available labs and Radiology studies were reviewed  I was present for key portions of any procedure(s) performed by the resident/non-physician practitioner and I was immediately available to provide assistance  At this point I agree with the current assessment done in the Emergency Department  I have conducted an independent evaluation of this patient a history and physical is as follows:    ED Course         Critical Care Time  Procedures    32 yo female c/o left sided flank pain since last night, took ibuprofen with some relief of menstrual cramps  Pt with pain with movement  No injury, no trauma, no urinary symtpoms, no hx of kidney stones  No n/v/d, no fever  Pt with lower abdominal cramps similar to previous menstruation  Pt with intercourse prior to pain  Pt smokes, drinks daily, smokes marijauna daily  Vss, afebrile, no distress, lungs cta, rrr, abdomen soft diffusely tenderness, mild, no rebound, no guarding  Left cva tenderness, paraspinal   Likely msk pain, rule out uti, urine preg, urine, pain meds

## 2020-09-22 NOTE — ED PROCEDURE NOTE
Procedure  POC Renal US    Date/Time: 9/21/2020 10:02 PM  Performed by: Beny Valdivia MD  Authorized by: Beny Valdivia MD     Patient location:  Bedside  Performed by:  Resident  Other Assisting Provider: No    Procedure details:     Exam Type:  Educational    Indications: flank/back pain      Assessment for:  Bladder volume and suspected hydronephrosis    Views obtained: bladder (transverse and sagittal), left kidney and right kidney      Image quality: diagnostic      Image availability:  Still images obtained and video obtained  Findings:     LEFT kidney findings: unremarkable      RIGHT kidney findings: unremarkable      Bladder:  Visualized  Interpretation:     Renal ultrasound impressions: normal exam                       Beny Valdivia MD  09/21/20 8435       Kyleigh Back MD  09/22/20 6420

## 2020-09-23 LAB — BACTERIA UR CULT: NORMAL

## 2020-12-22 ENCOUNTER — HOSPITAL ENCOUNTER (EMERGENCY)
Facility: HOSPITAL | Age: 30
Discharge: LEFT BEFORE DISCHARGE | End: 2020-12-22
Attending: EMERGENCY MEDICINE

## 2020-12-22 DIAGNOSIS — K08.89 PAIN, DENTAL: ICD-10-CM

## 2020-12-22 DIAGNOSIS — R10.2 ACUTE PELVIC PAIN: ICD-10-CM

## 2020-12-22 DIAGNOSIS — R10.9 ACUTE ABDOMINAL PAIN: Primary | ICD-10-CM

## 2020-12-22 LAB
BILIRUB UR QL STRIP: NEGATIVE
CLARITY UR: CLEAR
COLOR UR: YELLOW
EXT PREG TEST URINE: NEGATIVE
EXT. CONTROL ED NAV: NORMAL
GLUCOSE UR STRIP-MCNC: NEGATIVE MG/DL
HGB UR QL STRIP.AUTO: NEGATIVE
KETONES UR STRIP-MCNC: NEGATIVE MG/DL
LEUKOCYTE ESTERASE UR QL STRIP: NEGATIVE
NITRITE UR QL STRIP: NEGATIVE
PH UR STRIP.AUTO: 7 [PH] (ref 4.5–8)
PROT UR STRIP-MCNC: NEGATIVE MG/DL
SP GR UR STRIP.AUTO: 1.02 (ref 1–1.03)
UROBILINOGEN UR QL STRIP.AUTO: 0.2 E.U./DL

## 2020-12-22 PROCEDURE — 99284 EMERGENCY DEPT VISIT MOD MDM: CPT

## 2020-12-22 PROCEDURE — 81003 URINALYSIS AUTO W/O SCOPE: CPT

## 2020-12-22 PROCEDURE — 81025 URINE PREGNANCY TEST: CPT | Performed by: EMERGENCY MEDICINE

## 2020-12-22 PROCEDURE — 99284 EMERGENCY DEPT VISIT MOD MDM: CPT | Performed by: EMERGENCY MEDICINE

## 2020-12-22 RX ORDER — ACETAMINOPHEN 325 MG/1
975 TABLET ORAL ONCE
Status: DISCONTINUED | OUTPATIENT
Start: 2020-12-22 | End: 2020-12-22 | Stop reason: HOSPADM

## 2020-12-22 RX ORDER — DOXYCYCLINE HYCLATE 100 MG/1
100 CAPSULE ORAL ONCE
Status: DISCONTINUED | OUTPATIENT
Start: 2020-12-22 | End: 2020-12-22 | Stop reason: HOSPADM

## 2020-12-22 RX ORDER — KETOROLAC TROMETHAMINE 30 MG/ML
30 INJECTION, SOLUTION INTRAMUSCULAR; INTRAVENOUS ONCE
Status: DISCONTINUED | OUTPATIENT
Start: 2020-12-22 | End: 2020-12-22 | Stop reason: HOSPADM

## 2020-12-22 RX ORDER — METRONIDAZOLE 500 MG/1
500 TABLET ORAL ONCE
Status: DISCONTINUED | OUTPATIENT
Start: 2020-12-22 | End: 2020-12-22 | Stop reason: HOSPADM

## 2020-12-22 RX ORDER — DOXYCYCLINE HYCLATE 100 MG/1
100 CAPSULE ORAL 2 TIMES DAILY
Qty: 20 CAPSULE | Refills: 0 | Status: SHIPPED | OUTPATIENT
Start: 2020-12-22 | End: 2020-12-22

## 2020-12-22 RX ORDER — METRONIDAZOLE 500 MG/1
500 TABLET ORAL 2 TIMES DAILY
Qty: 28 TABLET | Refills: 0 | Status: SHIPPED | OUTPATIENT
Start: 2020-12-22 | End: 2020-12-22

## 2020-12-22 RX ORDER — CLINDAMYCIN HYDROCHLORIDE 300 MG/1
300 CAPSULE ORAL 4 TIMES DAILY
Qty: 28 CAPSULE | Refills: 0 | Status: SHIPPED | OUTPATIENT
Start: 2020-12-22 | End: 2020-12-29

## 2020-12-23 VITALS
BODY MASS INDEX: 51.65 KG/M2 | HEART RATE: 83 BPM | TEMPERATURE: 98.8 F | OXYGEN SATURATION: 98 % | SYSTOLIC BLOOD PRESSURE: 142 MMHG | RESPIRATION RATE: 18 BRPM | DIASTOLIC BLOOD PRESSURE: 75 MMHG | WEIGHT: 293 LBS

## 2020-12-30 ENCOUNTER — HOSPITAL ENCOUNTER (EMERGENCY)
Facility: HOSPITAL | Age: 30
Discharge: HOME | End: 2020-12-31
Attending: EMERGENCY MEDICINE | Admitting: EMERGENCY MEDICINE

## 2020-12-30 VITALS
TEMPERATURE: 98.6 F | OXYGEN SATURATION: 99 % | DIASTOLIC BLOOD PRESSURE: 79 MMHG | RESPIRATION RATE: 18 BRPM | SYSTOLIC BLOOD PRESSURE: 136 MMHG | HEART RATE: 87 BPM

## 2020-12-30 DIAGNOSIS — N94.6 MENSTRUAL CRAMPS: ICD-10-CM

## 2020-12-30 DIAGNOSIS — M54.50 ACUTE LOW BACK PAIN: Primary | ICD-10-CM

## 2020-12-30 PROCEDURE — 99283 EMERGENCY DEPT VISIT LOW MDM: CPT

## 2020-12-30 RX ORDER — IBUPROFEN 400 MG/1
800 TABLET ORAL ONCE
Status: COMPLETED | OUTPATIENT
Start: 2020-12-31 | End: 2020-12-31

## 2020-12-30 NOTE — Clinical Note
Wilma Jarvis was seen and treated in our emergency department on 12/30/2020  Diagnosis:     Heather  may return to work on return date  She may return on this date: 12/31/2020    Was seen on 12/30-12/31  If you have any questions or concerns, please don't hesitate to call        Rishi Coronado DO    ______________________________           _______________          _______________  Hospital Representative                              Date                                Time

## 2020-12-31 PROCEDURE — 99284 EMERGENCY DEPT VISIT MOD MDM: CPT | Performed by: EMERGENCY MEDICINE

## 2020-12-31 RX ADMIN — IBUPROFEN 800 MG: 400 TABLET ORAL at 00:13

## 2020-12-31 NOTE — ED ATTENDING ATTESTATION
12/30/2020  Hang Altman DO, saw and evaluated the patient  I have discussed the patient with the resident/non-physician practitioner and agree with the resident's/non-physician practitioner's findings, Plan of Care, and MDM as documented in the resident's/non-physician practitioner's note, except where noted  All available labs and Radiology studies were reviewed  I was present for key portions of any procedure(s) performed by the resident/non-physician practitioner and I was immediately available to provide assistance  At this point I agree with the current assessment done in the Emergency Department  I have conducted an independent evaluation of this patient a history and physical is as follows:    32 yo female c/o low back pain concurrent with menses  Reports that her boss told her to get a work note  Denies focal weakness/numbness/tingling  Pt would not allow me to examine her back  Pt was given ibuprofen, work note  Ambulated out of ED without assistance        ED Course         Critical Care Time  Procedures

## 2020-12-31 NOTE — ED PROVIDER NOTES
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  Patient states that she took Midol, which helped with the pain  Patient still experiencing some low back pain  Denies any trauma to the area, states that pain feels exactly like menstrual cramps  States that she does feel jittery after the Midol, but explained to her that this is likely due to the caffeine within the pill since patient is caffeine naive  Patient currently denies any fevers, chills, chest pain, shortness of breath, abdominal pain, nausea or vomiting  History provided by:  Patient   used: No        Prior to Admission Medications   Prescriptions Last Dose Informant Patient Reported? Taking? clindamycin (CLEOCIN) 300 MG capsule   No No   Sig: Take 1 capsule (300 mg total) by mouth 4 (four) times a day for 7 days      Facility-Administered Medications: None       History reviewed  No pertinent past medical history  History reviewed  No pertinent surgical history  History reviewed  No pertinent family history  I have reviewed and agree with the history as documented  E-Cigarette/Vaping    E-Cigarette Use Never User      E-Cigarette/Vaping Substances     Social History     Tobacco Use    Smoking status: Current Every Day Smoker     Types: Cigarettes    Smokeless tobacco: Never Used   Substance Use Topics    Alcohol use: Not Currently    Drug use: Yes     Types: Marijuana        Review of Systems   Constitutional: Negative for chills and fever     HENT: Negative for sore throat  Eyes: Negative for visual disturbance  Respiratory: Negative for cough and shortness of breath  Cardiovascular: Negative for chest pain and palpitations  Gastrointestinal: Negative for abdominal pain, nausea and vomiting  Genitourinary: Negative for dysuria and hematuria  Musculoskeletal: Positive for back pain  Skin: Negative for color change and rash  Neurological: Negative for headaches  All other systems reviewed and are negative  Physical Exam  ED Triage Vitals   Temperature Pulse Respirations Blood Pressure SpO2   12/30/20 2341 12/30/20 2345 12/30/20 2345 12/30/20 2341 12/30/20 2341   98 6 °F (37 °C) 87 18 136/79 99 %      Temp Source Heart Rate Source Patient Position - Orthostatic VS BP Location FiO2 (%)   12/30/20 2341 -- 12/30/20 2341 12/30/20 2341 --   Oral  Sitting Left arm       Pain Score       --                    Orthostatic Vital Signs  Vitals:    12/30/20 2341 12/30/20 2345   BP: 136/79    Pulse:  87   Patient Position - Orthostatic VS: Sitting        Physical Exam  Vitals signs and nursing note reviewed  Constitutional:       General: She is not in acute distress  Appearance: She is not ill-appearing  HENT:      Head: Normocephalic and atraumatic  Eyes:      General: Lids are normal  Vision grossly intact  Neck:      Musculoskeletal: Full passive range of motion without pain and neck supple  Cardiovascular:      Rate and Rhythm: Normal rate and regular rhythm  Heart sounds: S1 normal and S2 normal    Pulmonary:      Effort: Pulmonary effort is normal  No respiratory distress  Breath sounds: Normal breath sounds  No wheezing, rhonchi or rales  Abdominal:      Palpations: Abdomen is soft  Tenderness: There is no abdominal tenderness  Musculoskeletal:      Lumbar back: She exhibits tenderness  She exhibits no bony tenderness, no swelling, no deformity and no spasm  Skin:     General: Skin is warm and dry     Neurological: General: No focal deficit present  Mental Status: She is alert and oriented to person, place, and time  ED Medications  Medications   ibuprofen (MOTRIN) tablet 800 mg (has no administration in time range)       Diagnostic Studies  Results Reviewed     None                 No orders to display         Procedures  Procedures      ED Course                                       MDM  Number of Diagnoses or Management Options  Acute low back pain: new and does not require workup  Menstrual cramps: new and does not require workup  Diagnosis management comments: Patient is a 51-year-old female who presents with menstrual cramping and low back pain  No trauma to the area  Physical exam revealed mildly tender low back, no concerning physical exam findings  Patient was given 800 mg of ibuprofen and was discharged with a work note  Patient given strict ED return precautions and discharged in stable condition  Patient Progress  Patient progress: stable      Disposition  Final diagnoses:   Acute low back pain   Menstrual cramps     Time reflects when diagnosis was documented in both MDM as applicable and the Disposition within this note     Time User Action Codes Description Comment    12/31/2020 12:00 AM Tran Maki [M54 5] Acute low back pain     12/31/2020 12:00 AM Tran Maki [N94 6] Menstrual cramps       ED Disposition     ED Disposition Condition Date/Time Comment    Discharge Stable u Dec 31, 2020 12:00 AM Roddy Gao discharge to home/self care  Follow-up Information     Follow up With Specialties Details Why Contact Info Additional [address] Emergency Department Emergency Medicine Go to  If symptoms worsen [address] [place] [address], [address] [place] [address], [address] [phone]          Patient's Medications    No medications on file     No discharge procedures on file      PDMP Review None           ED Provider  Attending physically available and evaluated Liz Bains  ANDRA managed the patient along with the ED Attending      Electronically Signed by         Andres Vincent DO  12/31/20 2051

## 2022-03-18 ENCOUNTER — HOSPITAL ENCOUNTER (EMERGENCY)
Facility: HOSPITAL | Age: 32
Discharge: HOME | End: 2022-03-19
Attending: EMERGENCY MEDICINE | Admitting: EMERGENCY MEDICINE

## 2022-03-18 VITALS
RESPIRATION RATE: 18 BRPM | DIASTOLIC BLOOD PRESSURE: 90 MMHG | SYSTOLIC BLOOD PRESSURE: 134 MMHG | WEIGHT: 293 LBS | HEIGHT: 66 IN | BODY MASS INDEX: 47.09 KG/M2 | TEMPERATURE: 98.4 F | OXYGEN SATURATION: 97 % | HEART RATE: 78 BPM

## 2022-03-18 DIAGNOSIS — S93.402A LEFT ANKLE SPRAIN: Primary | ICD-10-CM

## 2022-03-18 PROCEDURE — 99282 EMERGENCY DEPT VISIT SF MDM: CPT | Performed by: EMERGENCY MEDICINE

## 2022-03-18 PROCEDURE — 99283 EMERGENCY DEPT VISIT LOW MDM: CPT

## 2022-03-18 NOTE — Clinical Note
Ramya Uriostegui was seen and treated in our emergency department on 3/18/2022  Diagnosis:     Heather  may return to work on return date  She may return on this date: 03/21/2022         If you have any questions or concerns, please don't hesitate to call        Gabby Canada MD    ______________________________           _______________          _______________  Hospital Representative                              Date                                Time

## 2022-03-19 NOTE — DISCHARGE INSTRUCTIONS
Your workup here was not concerning for anything dangerous  Therefore there is no need for you to stay at the hospital for further testing  We feel safe to send you home  You can use Advil for management of your symptoms  You should follow up with your primary care physician to assess for resolution of your symptoms and to determine if there is further evaluation that needs to be performed

## 2022-03-19 NOTE — ED PROVIDER NOTES
History  Chief Complaint   Patient presents with    Ankle Injury     pr reports right ankle pain and swelling     Heather BROOKS Arin Evans is a 36 y o  with PMH of obesity who presents with complaints of L ankle pain  She reports she had an initial injury several months ago with an inversion injury  She was seen at that time and workup was negative for x-ray  She had significant improvement of her symptoms however yesterday walking at work she aggravated the injury again  She is still able to walk but noticed swelling at the site again  She reports she needs a work note and is the primary she wanted to be seen  She denies any popping sensation, warmth, redness, purulence, fevers, chills, nausea, vomiting  She reports her pain is well controlled with Advil  She also denies any symptoms of headache, vision changes, dizziness, chest pain, shortness of breath, numbness, weakness in the arms or legs, difficulties walking, or sick contacts  The pt has no other complaints at this time  None       History reviewed  No pertinent past medical history  History reviewed  No pertinent surgical history  History reviewed  No pertinent family history  I have reviewed and agree with the history as documented  E-Cigarette/Vaping    E-Cigarette Use Never User      E-Cigarette/Vaping Substances     Social History     Tobacco Use    Smoking status: Current Every Day Smoker     Types: Cigarettes    Smokeless tobacco: Never Used   Vaping Use    Vaping Use: Never used   Substance Use Topics    Alcohol use: Not Currently    Drug use: Yes     Types: Marijuana        Review of Systems   Constitutional: Negative for chills and fever  HENT: Negative for ear pain and sore throat  Eyes: Negative for pain and visual disturbance  Respiratory: Negative for cough and shortness of breath  Cardiovascular: Negative for chest pain and palpitations  Gastrointestinal: Negative for abdominal pain and vomiting  Genitourinary: Negative for dysuria and hematuria  Musculoskeletal: Positive for joint swelling  Skin: Negative for color change and rash  Neurological: Negative for syncope  All other systems reviewed and are negative  Physical Exam  ED Triage Vitals [03/18/22 2302]   Temperature Pulse Respirations Blood Pressure SpO2   98 4 °F (36 9 °C) 78 18 134/90 97 %      Temp Source Heart Rate Source Patient Position - Orthostatic VS BP Location FiO2 (%)   Oral Monitor Sitting Left arm --      Pain Score       9             Orthostatic Vital Signs  Vitals:    03/18/22 2302   BP: 134/90   Pulse: 78   Patient Position - Orthostatic VS: Sitting       Physical Exam  Vitals and nursing note reviewed  Constitutional:       General: She is not in acute distress  Appearance: She is well-developed  She is obese  HENT:      Head: Normocephalic and atraumatic  Eyes:      Conjunctiva/sclera: Conjunctivae normal    Cardiovascular:      Rate and Rhythm: Normal rate and regular rhythm  Heart sounds: No murmur heard  Pulmonary:      Effort: Pulmonary effort is normal  No respiratory distress  Breath sounds: Normal breath sounds  Abdominal:      Palpations: Abdomen is soft  Tenderness: There is no abdominal tenderness  Musculoskeletal:         General: Swelling present  Cervical back: Neck supple  Comments: L swelling in the area of the ankle, no crepitus, range of motion intact, neurovascularly intact, she is ambulatory without difficulty, no warmth, erythema, or fluctuance noted   Skin:     General: Skin is warm and dry  Neurological:      Mental Status: She is alert  ED Medications  Medications - No data to display    Diagnostic Studies  Results Reviewed     None                 No orders to display         Procedures  Procedures      ED Course                                       KRISTYN Moran is a 36 y o  who presents with complaints of L ankle swelling  Vital signs are stable, physical exam shows ankle findings as per above  Pt is declining an x-ray at this time  Low likelihood of fracture given her benign exam findings  Likely represents soft tissue injury  Very low suspicion for infection as this does not correlate clinically  Will send home with work note and Voltaren gel  Pt was agreeable to this  I discussed the above care and treatment plan with the pt and answered all of their relevant questions  She expressed understanding, was agreeable to the plan of care, and had no further questions or concerns  Portions of the record may have been created with voice recognition software  Occasional wrong word or "sound a like" substitutions may have occurred due to the inherent limitations of voice recognition software  Read the chart carefully and recognize, using context, where substitutions have occurred      Disposition  Final diagnoses:   Left ankle sprain     Time reflects when diagnosis was documented in both MDM as applicable and the Disposition within this note     Time User Action Codes Description Comment    3/19/2022 12:01 AM Jacque Rodriguez Add [X70 845I] Left ankle sprain       ED Disposition     ED Disposition Condition Date/Time Comment    Discharge Stable Sat Mar 19, 2022 12:01 AM Reji Alcantar discharge to home/self care  Follow-up Information    None         Patient's Medications   Discharge Prescriptions    DICLOFENAC SODIUM (VOLTAREN) 1 %    Apply 2 g topically 4 (four) times a day for 13 days       Start Date: 3/19/2022 End Date: 4/1/2022       Order Dose: 2 g       Quantity: 100 g    Refills: 0     No discharge procedures on file  PDMP Review     None           ED Provider  Attending physically available and evaluated Reji Alcantar  I managed the patient along with the ED Attending      Electronically Signed by         Sia Holguin MD  03/19/22 9576

## 2022-03-20 NOTE — ED ATTENDING ATTESTATION
3/18/2022  IGordo MD, saw and evaluated the patient  I have discussed the patient with the resident/non-physician practitioner and agree with the resident's/non-physician practitioner's findings, Plan of Care, and MDM as documented in the resident's/non-physician practitioner's note, except where noted  All available labs and Radiology studies were reviewed  I was present for key portions of any procedure(s) performed by the resident/non-physician practitioner and I was immediately available to provide assistance  At this point I agree with the current assessment done in the Emergency Department  I have conducted an independent evaluation of this patient a history and physical is as follows:    ED Course     77-year-old female left ankle injury sustained while at work patient states she has had a prior ankle injury same side patient was on her feet at work when she may have  twisted her ankle  Vital signs reviewed  Patient denies any additional injuries  Examination ankle has tenderness and swelling there is anterior lateral aspect of ankle  There is no erythema there is no warmth  Patient has full range of motion passive with minimal pain  Patient is able to ambulate weight-bearing emergency department patient meets Dillingham criteria imaging not indicated at this time  Impression ankle sprain will provide patient with stirrup splint note for work as requested  Follow-up with Podiatry as outpatient      Critical Care Time  Procedures

## 2022-08-06 ENCOUNTER — HOSPITAL ENCOUNTER (EMERGENCY)
Facility: HOSPITAL | Age: 32
Discharge: HOME | End: 2022-08-06
Attending: EMERGENCY MEDICINE

## 2022-08-06 VITALS
RESPIRATION RATE: 18 BRPM | TEMPERATURE: 97.3 F | HEART RATE: 85 BPM | OXYGEN SATURATION: 98 % | SYSTOLIC BLOOD PRESSURE: 109 MMHG | DIASTOLIC BLOOD PRESSURE: 77 MMHG

## 2022-08-06 DIAGNOSIS — K02.9 DENTAL CARIES: ICD-10-CM

## 2022-08-06 DIAGNOSIS — K08.89 DENTALGIA: Primary | ICD-10-CM

## 2022-08-06 PROCEDURE — 99284 EMERGENCY DEPT VISIT MOD MDM: CPT | Performed by: EMERGENCY MEDICINE

## 2022-08-06 PROCEDURE — 99282 EMERGENCY DEPT VISIT SF MDM: CPT

## 2022-08-06 RX ORDER — PENICILLIN V POTASSIUM 500 MG/1
500 TABLET ORAL ONCE
Status: COMPLETED | OUTPATIENT
Start: 2022-08-06 | End: 2022-08-06

## 2022-08-06 RX ORDER — PENICILLIN V POTASSIUM 500 MG/1
500 TABLET ORAL 4 TIMES DAILY
Qty: 28 TABLET | Refills: 0 | Status: SHIPPED | OUTPATIENT
Start: 2022-08-06 | End: 2022-08-13

## 2022-08-06 RX ORDER — CHLORHEXIDINE GLUCONATE 0.12 MG/ML
15 RINSE ORAL 2 TIMES DAILY
Qty: 120 ML | Refills: 0 | Status: SHIPPED | OUTPATIENT
Start: 2022-08-06

## 2022-08-06 RX ADMIN — PENICILLIN V POTASSIUM 500 MG: 500 TABLET, FILM COATED ORAL at 13:43

## 2022-08-06 NOTE — ED ATTENDING ATTESTATION
Final Diagnosis:  1  Dentalgia    2  Dental caries           IJoe MD, saw and evaluated the patient  All available labs and X-rays were ordered by me or the resident and have been reviewed by myself  I discussed the patient with the resident / non-physician and agree with the resident's / non-physician practitioner's findings and plan as documented in the resident's / non-physician practicitioner's note, except where noted  At this point, I agree with the current assessment done in the ED  I was present during key portions of all procedures performed unless otherwise stated  Chief Complaint   Patient presents with    Dental Pain     Pt presents ambulatory with c/o L side dental pain and cracked filling     This is a 36 y o  female presenting for evaluation of LEFT-sided dental pain  The patient has a hx of root canal that fell out 2 years ago  She has had episodic dental pain where it hurts a lot and would resolve with antibiotics  The patient for the last several days has been having increased pain so came in for evaluation  Tom[birth year] is her birthday and she wanted antibiotics ot help the pain b/c tylenol/motrin doesn't help much  Denies any upper respiratory tract infection symptoms (cough, congestion, rhinorrhea, sore throat)  No trouble swallowing  No change in voice  Has an "itchy" sensation in her mouth where it hurts  Feels exactly like previous episodes  PMH:   has no past medical history on file  PSH:   has no past surgical history on file      Social:  Social History     Substance and Sexual Activity   Alcohol Use Not Currently     Social History     Tobacco Use   Smoking Status Current Every Day Smoker    Types: Cigarettes   Smokeless Tobacco Never Used     Social History     Substance and Sexual Activity   Drug Use Yes    Types: Marijuana     PE:  Vitals:    08/06/22 1303   BP: 109/77   Pulse: 85   Resp: 18   Temp: (!) 97 3 °F (36 3 °C)   TempSrc: Temporal   SpO2: 98%   General: VS reviewed  Appears in NAD  awake, alert  Well-nourished, well-developed  Appears stated age  Speaking normally in full sentences  Head: Normocephalic, atraumatic  Eyes: EOM-I  No diplopia  No hyphema  No subconjunctival hemorrhages  Symmetrical lids  ENT: Atraumatic external nose and ears  MMM  Obvious large dental carry in the LEFT upper teeth, tooth 14  Left lower (tooth 19) also appears like it needs to be extracted due to large carry  Poor dentition  No facial swelling  No change in voice  No malocclusion  No stridor  Normal phonation  No drooling  Normal swallowing  Neck: No JVD  CV: No pallor noted  Lungs:   No tachypnea  No respiratory distress  MSK:   FROM spontaneously  Skin: Dry, intact  Neuro: Awake, alert, GCS15, CN II-XII grossly intact  Motor grossly intact  Psychiatric/Behavioral: Appropriate mood and affect   Exam: deferred  A/P:   - Dentalgia  Questionable kenny-apical abscess  - We will give tylenol/motrin for pain  - We will write for chlorhexidine to sterilize the area and prevent further worsening of infection or repeat infection   - Given the appearance, we will also do antibiotics:  [    ] PCN 500mg QID  [ x ] Amoxicillin 20-40mg/kg/day TID  [    ] Augmentin 20mg/kg BID  [    ] Clindamycin (PCN allergy or worsening infection after 72 hours of amoxicillin) 8-20mg/kg/day TID  [    ] Azithromycin (alternative to clindamycin) 5-12mg/kg Qday  [    ] Metronidazole (30mg/kg/day QID) + Amoxicillin if suspected resistant infection  - The patient will follow-up with her primary care or dentist for re-evaluation of her symptoms   - The patient has no red flags for Edilson's or serious dental infection at this juncture  The patient doesn't appear toxic, nor has rapid progression of symptoms  Patient isn't immunocompromised  Patient has no SOB nor trouble swallowing   Patient doesn't appear dehydrated nor demonstrates trismus on exam    - 13 point ROS was performed and all are normal unless stated in the history above  - Nursing note reviewed  Vitals reviewed  - Orders placed by myself and/or advanced practitioner / resident     - Previous chart was reviewed  - No language barrier    - History obtained from patient  - There are no limitations to the history obtained  - Critical care time: Not applicable for this patient  Code Status: No Order  Advance Directive and Living Will:      Power of :    POLST:      Medications   penicillin V potassium (VEETID) tablet 500 mg (has no administration in time range)     No orders to display     No orders of the defined types were placed in this encounter  Labs Reviewed - No data to display  Time reflects when diagnosis was documented in both MDM as applicable and the Disposition within this note     Time User Action Codes Description Comment    8/6/2022  1:19 PM Savanah Mast Add [K08 89] Dentalgia     8/6/2022  1:19 PM Savanah Mast Add [K02 9] Dental caries       ED Disposition     ED Disposition   Discharge    Condition   Stable    Date/Time   Sat Aug 6, 2022  1:19 PM    Comment   Maikel Osei discharge to home/self care  Follow-up Information    None       Patient's Medications   Discharge Prescriptions    No medications on file     No discharge procedures on file  Prior to Admission Medications   Prescriptions Last Dose Informant Patient Reported? Taking? Diclofenac Sodium (VOLTAREN) 1 %   No No   Sig: Apply 2 g topically 4 (four) times a day for 13 days      Facility-Administered Medications: None       Portions of the record may have been created with voice recognition software  Occasional wrong word or "sound a like" substitutions may have occurred due to the inherent limitations of voice recognition software  Read the chart carefully and recognize, using context, where substitutions have occurred      Electronically signed by:  Sara Boss

## 2022-08-06 NOTE — ED PROVIDER NOTES
History  Chief Complaint   Patient presents with    Dental Pain     Pt presents ambulatory with c/o L side dental pain and cracked filling     Patient is a 61-year-old female who presents for evaluation of dental pain  Patient states that she had a root canal on a left rear molar which developed 2 years ago  Patient states that since then, she has had intermittent issues with dental infections  Patient states that over the past few days, she has had worsening pain in the left upper and lower molars  Patient states that she does have known cavities in these areas  Patient has been taking Tylenol and ibuprofen without significant relief in symptoms  Patient denies any facial swelling, drooling, difficulty swallowing, difficulty breathing, or trismus  She denies any associated fever chills  Patient states that she has had these infections before, states that they typically resolve with oral antibiotics  History provided by:  Patient   used: No    Dental Pain  Location:  Upper and lower  Chronicity:  Recurrent  Context: dental caries, dental fracture and filling fell out    Ineffective treatments:  Acetaminophen and NSAIDs  Associated symptoms: no difficulty swallowing, no drooling, no facial swelling, no fever and no neck swelling        Prior to Admission Medications   Prescriptions Last Dose Informant Patient Reported? Taking? Diclofenac Sodium (VOLTAREN) 1 %   No No   Sig: Apply 2 g topically 4 (four) times a day for 13 days      Facility-Administered Medications: None       History reviewed  No pertinent past medical history  History reviewed  No pertinent surgical history  History reviewed  No pertinent family history  I have reviewed and agree with the history as documented      E-Cigarette/Vaping    E-Cigarette Use Never User      E-Cigarette/Vaping Substances     Social History     Tobacco Use    Smoking status: Current Every Day Smoker     Types: Cigarettes    Smokeless tobacco: Never Used   Vaping Use    Vaping Use: Never used   Substance Use Topics    Alcohol use: Not Currently    Drug use: Yes     Types: Marijuana        Review of Systems   Constitutional: Negative for chills and fever  HENT: Positive for dental problem  Negative for drooling, facial swelling, trouble swallowing and voice change  Respiratory: Negative for shortness of breath  Cardiovascular: Negative  Gastrointestinal: Negative  Genitourinary: Negative  Musculoskeletal: Negative  Skin: Negative  Neurological: Negative  All other systems reviewed and are negative  Physical Exam  ED Triage Vitals [08/06/22 1303]   Temperature Pulse Respirations Blood Pressure SpO2   (!) 97 3 °F (36 3 °C) 85 18 109/77 98 %      Temp Source Heart Rate Source Patient Position - Orthostatic VS BP Location FiO2 (%)   Temporal Monitor -- -- --      Pain Score       --             Orthostatic Vital Signs  Vitals:    08/06/22 1303   BP: 109/77   Pulse: 85       Physical Exam  Vitals and nursing note reviewed  Constitutional:       General: She is awake  She is not in acute distress  Appearance: She is not toxic-appearing  HENT:      Head: Normocephalic and atraumatic  Mouth/Throat:      Lips: Pink  Mouth: Mucous membranes are moist       Dentition: Dental caries present  No dental abscesses  Tongue: Tongue does not deviate from midline  Pharynx: Oropharynx is clear  Comments: Dental caries in teeth # 14 and 19  No surrounding dental abscesses noted  Eyes:      General: Vision grossly intact  Gaze aligned appropriately  Cardiovascular:      Rate and Rhythm: Normal rate and regular rhythm  Pulmonary:      Effort: Pulmonary effort is normal  No respiratory distress  Musculoskeletal:      Cervical back: Full passive range of motion without pain and neck supple  Skin:     General: Skin is warm and dry  Neurological:      General: No focal deficit present  Mental Status: She is alert and oriented to person, place, and time  ED Medications  Medications   penicillin V potassium (VEETID) tablet 500 mg (500 mg Oral Given 8/6/22 1343)       Diagnostic Studies  Results Reviewed     None                 No orders to display         Procedures  Procedures      ED Course                                       MDM  Number of Diagnoses or Management Options  Dental caries: established and worsening  Dentalgia: established and worsening  Diagnosis management comments: 30-year-old female presents with left upper and lower molar dental pain  Patient states that she has had similar symptoms the past which have been treated with oral antibiotics  Patient does take Tylenol and Motrin without relief of symptoms  On exam, patient noted to have dental caries in teeth #'s 14 and 19  No obvious surrounding dental abscesses  Patient given single dose of penicillin in the emergency department  Patient given prescription for penicillin and Peridex  Patient given dental clinic follow-up, discharged home in stable condition with symptomatic care instructions and strict ED return precautions  Disposition  Final diagnoses:   Dentalgia   Dental caries     Time reflects when diagnosis was documented in both MDM as applicable and the Disposition within this note     Time User Action Codes Description Comment    8/6/2022  1:19 PM Ileana Hammond Add [K08 89] Dentalgia     8/6/2022  1:19 PM Ileana Hammond Add [K02 9] Dental caries       ED Disposition     ED Disposition   Discharge    Condition   Stable    Date/Time   Sat Aug 6, 2022  1:19 PM    Comment   Kay Epps discharge to home/self care                 Follow-up Information     Follow up With Specialties Details Why Contact Info [facility] Dentistry Schedule an appointment as soon as possible for a visit in 1 week  [facility] [phone]  [address] [place], [place], [address], [address] [facility] Emergency Department Emergency Medicine Go to  If symptoms worsen [address] [phone]  [address] Emergency Department, [address] [facility], [place], [place], [address]          Discharge Medication List as of 8/6/2022  1:23 PM      START taking these medications    Details   chlorhexidine (PERIDEX) 0 12 % solution Apply 15 mL to the mouth or throat 2 (two) times a day, Starting Sat 8/6/2022, Normal      penicillin V potassium (VEETID) 500 mg tablet Take 1 tablet (500 mg total) by mouth 4 (four) times a day for 7 days, Starting Sat 8/6/2022, Until Sat 8/13/2022, Normal         CONTINUE these medications which have NOT CHANGED    Details   Diclofenac Sodium (VOLTAREN) 1 % Apply 2 g topically 4 (four) times a day for 13 days, Starting Sat 3/19/2022, Until Fri 4/1/2022, Normal           No discharge procedures on file  PDMP Review     None           ED Provider  Attending physically available and evaluated Cami Montano I managed the patient along with the ED Attending      Electronically Signed by         Palak Clark DO  08/14/22 1159

## 2022-08-06 NOTE — DISCHARGE INSTRUCTIONS
Please use the following over the counter pain medications as prescribed:  - Tylenol 650mg every 6 hours  - Motrin 400mg every 6 hours  - Orajel OTC  They work in different ways so can be used together at the same time  We will give a script for peridex wash  This is an anti-septic which will sterilize the area and prevent and infection from occurring  This can only be used for 2-3 weeks  If you use it longer, it might cause some pink staining of your teeth  It works like a super powered form of Listerine  Lastly, I am starting you on antibiotics  I would like you to take the full course as prescribed  This should help the pain and swelling  If despite the above you have worsening symptoms please return to the emergency room for re-evaluation  If you HAVE A DENTIST, please call to make an appointment with them for follow-up as soon as possible  If you DO NOT have a dentist, please call [phone]  It is a service that will help you find a dentist in your area and based on your insurance (if you have it or not) after you answer some questions    You may also follow-up with the Kun Medrano if you don't have a density  [address] [facility] [address]  [address] [facility]  [address] [facility], [address] [facility]  [phone]  They will see you with or without insurance and have walk in hours in the morning without appointments       Other local dental clinics include:   [address] [facility]  [address] [facility], [address] [facility]  [phone]  Walk in house M-F [address]  [address], [address]  [place], [address] [place] [address] [place]  [phone]    Dr Zeny [address], [address]  Takes adults & children on a waiting list    [facility]  [facility], [address] [phone]  Walk in scheduled only M-F 8a-noon & 1p-4p  Uninsured received 40% discount & payments  Payment must be made upfront before the service  Emergency [address]  [address] [facility], [address] [place] [address] in [place]

## 2022-10-17 ENCOUNTER — HOSPITAL ENCOUNTER (EMERGENCY)
Facility: HOSPITAL | Age: 32
Discharge: HOME | End: 2022-10-17
Attending: EMERGENCY MEDICINE

## 2022-10-17 VITALS
HEART RATE: 92 BPM | SYSTOLIC BLOOD PRESSURE: 131 MMHG | RESPIRATION RATE: 20 BRPM | OXYGEN SATURATION: 100 % | TEMPERATURE: 98.4 F | DIASTOLIC BLOOD PRESSURE: 91 MMHG

## 2022-10-17 DIAGNOSIS — M25.561 ACUTE PAIN OF RIGHT KNEE: Primary | ICD-10-CM

## 2022-10-17 PROCEDURE — 99283 EMERGENCY DEPT VISIT LOW MDM: CPT

## 2022-10-17 PROCEDURE — 99284 EMERGENCY DEPT VISIT MOD MDM: CPT | Performed by: EMERGENCY MEDICINE

## 2022-10-17 RX ORDER — LIDOCAINE 50 MG/G
1 PATCH TOPICAL DAILY
Qty: 6 PATCH | Refills: 0 | Status: SHIPPED | OUTPATIENT
Start: 2022-10-17 | End: 2022-10-23

## 2022-10-17 RX ORDER — LIDOCAINE 50 MG/G
1 PATCH TOPICAL ONCE
Status: DISCONTINUED | OUTPATIENT
Start: 2022-10-17 | End: 2022-10-17 | Stop reason: HOSPADM

## 2022-10-17 RX ORDER — IBUPROFEN 600 MG/1
600 TABLET ORAL ONCE
Status: COMPLETED | OUTPATIENT
Start: 2022-10-17 | End: 2022-10-17

## 2022-10-17 RX ADMIN — LIDOCAINE 5% 1 PATCH: 700 PATCH TOPICAL at 19:12

## 2022-10-17 RX ADMIN — IBUPROFEN 600 MG: 600 TABLET ORAL at 18:50

## 2022-10-17 NOTE — Clinical Note
Valery Rivero was seen and treated in our emergency department on 10/17/2022  Diagnosis: Right knee pain    Heather  may return to work on return date  She may return on this date: 10/20/2022         If you have any questions or concerns, please don't hesitate to call        Hector Gonzalez DO    ______________________________           _______________          _______________  Hospital Representative                              Date                                Time

## 2022-10-17 NOTE — ED ATTENDING ATTESTATION
10/17/2022  IKeli MD, saw and evaluated the patient  I have discussed the patient with the resident/non-physician practitioner and agree with the resident's/non-physician practitioner's findings, Plan of Care, and MDM as documented in the resident's/non-physician practitioner's note, except where noted  All available labs and Radiology studies were reviewed  I was present for key portions of any procedure(s) performed by the resident/non-physician practitioner and I was immediately available to provide assistance  At this point I agree with the current assessment done in the Emergency Department  I have conducted an independent evaluation of this patient a history and physical is as follows:    ED Course         Critical Care Time  Procedures    28 yo female with right knee pain for two days  Pt able to bear weight but limping  No trauma, no injury  Pt was dancing over the weekend  No previous knee injury, no pain meds at home  No fever, no swelling  Vss, afebrile, lungs cta, rrr, right knee tenderness, no effusion, full rom, nvi, stable   Ace wrap, work note,

## 2022-10-17 NOTE — DISCHARGE INSTRUCTIONS
You were seen in the emergency department for right knee pain  You did not want an x ray  We gave you a dose of ibuprofen  Continue to take ibuprofen every 8 hours for your pain  Use the ace wrap when walking  If you continue to have pain the number for orthopedic surgery and occupational medicine is attached  If you have worsening pain or develop any other symptoms that are concerning to you please return to the emergency department

## 2022-10-17 NOTE — ED PROVIDER NOTES
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"  C/o of right knee pain  Pt reports she has not tried ice or tylenol  Pt states she needs work note  45-year-old female with no significant past medical history who presents with 2 days of right anterior knee pain  She states that the pain has gradually worsened  She states that the pain is worse with movement  The pain does not radiate  She states that over the weekend she sing and dance and a concert, and feels that her pain is related to this  She denies any trauma to the knee  She has not taken any medications her pain  She states that today while she was at work her boss noticed she was limping and asked her to be evaluated  She denies fever, chills, nausea, vomiting, chest pain, shortness of breath, and numbness in her leg  Prior to Admission Medications   Prescriptions Last Dose Informant Patient Reported? Taking? Diclofenac Sodium (VOLTAREN) 1 %   No No   Sig: Apply 2 g topically 4 (four) times a day for 13 days   chlorhexidine (PERIDEX) 0 12 % solution   No No   Sig: Apply 15 mL to the mouth or throat 2 (two) times a day      Facility-Administered Medications: None       History reviewed  No pertinent past medical history  History reviewed  No pertinent surgical history  History reviewed  No pertinent family history  I have reviewed and agree with the history as documented  E-Cigarette/Vaping   • E-Cigarette Use Never User      E-Cigarette/Vaping Substances     Social History     Tobacco Use   • Smoking status: Current Every Day Smoker     Types: Cigarettes   • Smokeless tobacco: Never Used   Vaping Use   • Vaping Use: Never used   Substance Use Topics   • Alcohol use: Not Currently   • Drug use: Yes     Types: Marijuana        Review of Systems   Constitutional: Negative for chills, diaphoresis and fever     HENT: Negative for congestion and sore throat  Eyes: Negative for pain and redness  Respiratory: Negative for cough and shortness of breath  Cardiovascular: Negative for chest pain and palpitations  Gastrointestinal: Negative for abdominal pain, diarrhea, nausea and vomiting  Genitourinary: Negative for dysuria and hematuria  Musculoskeletal: Positive for arthralgias and joint swelling  Negative for back pain and neck pain  Skin: Negative for color change, pallor and rash  Neurological: Negative for weakness, light-headedness, numbness and headaches  All other systems reviewed and are negative  Physical Exam  ED Triage Vitals [10/17/22 1706]   Temperature Pulse Respirations Blood Pressure SpO2   98 4 °F (36 9 °C) 92 20 131/91 100 %      Temp Source Heart Rate Source Patient Position - Orthostatic VS BP Location FiO2 (%)   Oral Monitor Sitting Right arm --      Pain Score       --             Orthostatic Vital Signs  Vitals:    10/17/22 1706   BP: 131/91   Pulse: 92   Patient Position - Orthostatic VS: Sitting       Physical Exam  Vitals and nursing note reviewed  Constitutional:       General: She is not in acute distress  Appearance: Normal appearance  She is obese  She is not ill-appearing or toxic-appearing  HENT:      Head: Normocephalic and atraumatic  Nose: Nose normal  No congestion or rhinorrhea  Mouth/Throat:      Mouth: Mucous membranes are moist       Pharynx: Oropharynx is clear  Eyes:      General: No scleral icterus  Right eye: No discharge  Left eye: No discharge  Conjunctiva/sclera: Conjunctivae normal    Cardiovascular:      Rate and Rhythm: Normal rate and regular rhythm  Pulses: Normal pulses  Heart sounds: Normal heart sounds  No murmur heard  No friction rub  No gallop  Pulmonary:      Effort: Pulmonary effort is normal       Breath sounds: Normal breath sounds  No wheezing, rhonchi or rales     Abdominal:      General: Abdomen is flat       Palpations: Abdomen is soft  Tenderness: There is no abdominal tenderness  There is no guarding or rebound  Musculoskeletal:         General: Tenderness present  No swelling, deformity or signs of injury  Normal range of motion  Cervical back: Normal range of motion and neck supple  No rigidity or tenderness  Right lower leg: No edema  Left lower leg: No edema  Comments: Right LE: Full knee ROM  No erythema or swelling of the knee  Tenderness to palpation of the anterior knee  No laxity or instability of the knee joint  Neurovascularly intact  Lymphadenopathy:      Cervical: No cervical adenopathy  Skin:     General: Skin is warm and dry  Capillary Refill: Capillary refill takes less than 2 seconds  Coloration: Skin is not jaundiced or pale  Findings: No bruising, erythema, lesion or rash  Neurological:      General: No focal deficit present  Mental Status: She is alert and oriented to person, place, and time  Sensory: No sensory deficit  Motor: No weakness  ED Medications  Medications   lidocaine (LIDODERM) 5 % patch 1 patch (1 patch Topical Medication Applied 10/17/22 1912)   ibuprofen (MOTRIN) tablet 600 mg (600 mg Oral Given 10/17/22 1850)       Diagnostic Studies  Results Reviewed     None                 No orders to display         Procedures  Procedures      ED Course                                       MDM  Number of Diagnoses or Management Options  Acute pain of right knee  Diagnosis management comments: 29year old female who presents with right anterior knee pain that began two days ago  She has had similar pain in the past that has resolved with rest  She denies any injury or trauma to the area  She states that she was sent in by her boss for evaluation and a work note  Her vitals are within the normal limits  On exam she has no erythema, swelling or deformity of her right knee  She has full ROM of her right knee   She has tenderness to palpation of the anterior right knee without joint instability  The patient declines an x ray  Will give a dose of ibuprofen  The patient requests a lidocaine patch  Will apply an ace wrap to the right knee  The patient is given the numbers for orthopedic surgery and occupational medicine  The patient is given return precautions and discharged  Disposition  Final diagnoses:   Acute pain of right knee     Time reflects when diagnosis was documented in both MDM as applicable and the Disposition within this note     Time User Action Codes Description Comment    10/17/2022  6:54 PM Jairo Simental Add [F59 089] Acute pain of right knee       ED Disposition     ED Disposition   Discharge    Condition   Stable    Date/Time   Mon Oct 17, 2022  6:55 PM    Comment   Savita Mae discharge to home/self care                 Follow-up Information     Follow up With Specialties Details Why Contact Info Additional [address] Emergency Department Emergency Medicine Go to  If symptoms worsen [address] [phone]  [address] Emergency Department, [address], 600 [facility] Orthopedic Surgery Schedule an appointment as soon as possible for a visit   [address] [facility] 30 [facility] [address], [address] [facility]  Use Entrance [address]  Call in 3 days  [address] [facility] [address] [phone]           Discharge Medication List as of 10/17/2022  7:13 PM      START taking these medications    Details   lidocaine (Lidoderm) 5 % Apply 1 patch topically daily for 6 days Remove & Discard patch within 12 hours or as directed by MD, Starting Mon 10/17/2022, Until Sun 10/23/2022, Normal         CONTINUE these medications which have NOT CHANGED    Details   chlorhexidine (PERIDEX) 0 12 % solution Apply 15 mL to the mouth or throat 2 (two) times a day, Starting Sat 8/6/2022, Normal      Diclofenac Sodium (VOLTAREN) 1 % Apply 2 g topically 4 (four) times a day for 13 days, Starting Sat 3/19/2022, Until Fri 4/1/2022, Normal           No discharge procedures on file  PDMP Review     None           ED Provider  Attending physically available and evaluated Varshaniki Carlos SILVERMAN managed the patient along with the ED Attending      Electronically Signed by         Corrinne Crafts,   10/17/22 0628

## 2023-06-05 ENCOUNTER — HOSPITAL ENCOUNTER (EMERGENCY)
Facility: HOSPITAL | Age: 33
Discharge: HOME | End: 2023-06-05
Attending: EMERGENCY MEDICINE

## 2023-06-05 VITALS
WEIGHT: 293 LBS | TEMPERATURE: 97.9 F | SYSTOLIC BLOOD PRESSURE: 129 MMHG | OXYGEN SATURATION: 100 % | HEIGHT: 65 IN | BODY MASS INDEX: 48.82 KG/M2 | HEART RATE: 95 BPM | RESPIRATION RATE: 18 BRPM | DIASTOLIC BLOOD PRESSURE: 81 MMHG

## 2023-06-05 DIAGNOSIS — T78.40XA ALLERGY, INITIAL ENCOUNTER: Primary | ICD-10-CM

## 2023-06-05 PROCEDURE — 99282 EMERGENCY DEPT VISIT SF MDM: CPT

## 2023-06-05 RX ORDER — FLUTICASONE PROPIONATE 50 MCG
1 SPRAY, SUSPENSION (ML) NASAL DAILY
Qty: 16 G | Refills: 0 | Status: SHIPPED | OUTPATIENT
Start: 2023-06-05

## 2023-06-05 NOTE — Clinical Note
Lauren Emma was seen and treated in our emergency department on 6/5/2023  Diagnosis:     Heather  may return to work on return date  She may return on this date: 06/07/2023         If you have any questions or concerns, please don't hesitate to call        Jose Franklin, DO    ______________________________           _______________          _______________  Hospital Representative                              Date                                Time

## 2023-06-05 NOTE — ED PROVIDER NOTES
History  Chief Complaint   Patient presents with   • Nasal Congestion     Pt reports flare in seasonal allergies; pt reports ongoing x 1 month; eye irritation, nasal congestion; pt reports she has been using eyedrops without relief; no relief with Claritin     This is a 53-year-old female who presents to the emergency department complaining of nasal congestion  The patient states she has seasonal allergies  She feels like they are normal nasal allergies  She complains of dryness to her eyes and feels that it is difficult to put her contacts in  She has an over-the-counter eyedrops that does help  She denies fevers or chills  She denies any cough  She denies nausea or vomiting  She denies chest pain or trouble breathing  Prior to Admission Medications   Prescriptions Last Dose Informant Patient Reported? Taking? Diclofenac Sodium (VOLTAREN) 1 %   No No   Sig: Apply 2 g topically 4 (four) times a day for 13 days   chlorhexidine (PERIDEX) 0 12 % solution   No No   Sig: Apply 15 mL to the mouth or throat 2 (two) times a day   lidocaine (Lidoderm) 5 %   No No   Sig: Apply 1 patch topically daily for 6 days Remove & Discard patch within 12 hours or as directed by MD      Facility-Administered Medications: None       History reviewed  No pertinent past medical history  History reviewed  No pertinent surgical history  History reviewed  No pertinent family history  I have reviewed and agree with the history as documented  E-Cigarette/Vaping   • E-Cigarette Use Never User      E-Cigarette/Vaping Substances     Social History     Tobacco Use   • Smoking status: Every Day     Packs/day: 0 25     Types: Cigarettes   • Smokeless tobacco: Never   Vaping Use   • Vaping Use: Never used   Substance Use Topics   • Alcohol use: Not Currently   • Drug use: Yes     Types: Marijuana       Review of Systems   All other systems reviewed and are negative        Physical Exam  Physical Exam  Constitutional:  Vital signs reviewed, patient appears nontoxic, no acute distress  Eyes: Pupils equal round reactive to light and accommodation, extraocular muscles intact  HEENT: trachea midline, no JVD, moist mucous membranes  Respiratory: lung sounds clear throughout, no rhonchi, no rales  Cardiovascular: regular rate rhythm, no murmurs or rubs  Abdomen: soft, nontender, nondistended, no rebound or guarding  Back: no CVA tenderness, normal inspection  Extremities: no edema, pulses equal in all 4 extremities  Neuro: awake, alert, oriented, no focal weakness  Skin: warm, dry, intact, no rashes noted    Vital Signs  ED Triage Vitals   Temperature Pulse Respirations Blood Pressure SpO2   06/05/23 1508 06/05/23 1507 06/05/23 1507 06/05/23 1507 06/05/23 1507   97 9 °F (36 6 °C) 95 18 129/81 100 %      Temp Source Heart Rate Source Patient Position - Orthostatic VS BP Location FiO2 (%)   06/05/23 1508 06/05/23 1507 06/05/23 1507 -- --   Oral Monitor Sitting        Pain Score       --                  Vitals:    06/05/23 1507   BP: 129/81   Pulse: 95   Patient Position - Orthostatic VS: Sitting         Visual Acuity      ED Medications  Medications - No data to display    Diagnostic Studies  Results Reviewed     None                 No orders to display              Procedures  Procedures         ED Course                                             Medical Decision Making  This is a 59-year-old female who presents to the emergency department complaining of seasonal allergies  I considered allergic rhinitis, seasonal allergies, sinusitis  These and other diagnoses were considered  The patient is well-appearing on exam   She is not tachycardic or febrile  Her eyes are have no conjunctivitis  She will be started on Flonase and discharged with follow-up to her primary care physician  Disposition  Final diagnoses:    Allergy, initial encounter     Time reflects when diagnosis was documented in both MDM as applicable and the Disposition within this note     Time User Action Codes Description Comment    6/5/2023  3:29 PM Madalynn Gear Add [R09 81] Nasal congestion     6/5/2023  3:29 PM Madalynn Gear Remove [R09 81] Nasal congestion     6/5/2023  3:29 PM Madalynn Gear Add [T78 40XA] Allergy, initial encounter       ED Disposition     ED Disposition   Discharge    Condition   Stable    Date/Time   Mon Jun 5, 2023  3:29 PM    Comment   Darwin Barrios discharge to home/self care  Follow-up Information     Follow up With Specialties Details Why Contact Info Additional Information    [address] [facility] Medicine Schedule an appointment as soon as possible for a visit in 2 days  [address] [phone]  [address]  , [place], [place], [address] Emergency Department Emergency Medicine  If symptoms worsen [phone]  [address] [facility] Emergency Department, [address], [address]          Discharge Medication List as of 6/5/2023  3:31 PM      START taking these medications    Details   fluticasone (FLONASE) 50 mcg/act nasal spray 1 spray into each nostril daily, Starting Mon 6/5/2023, Normal         CONTINUE these medications which have NOT CHANGED    Details   chlorhexidine (PERIDEX) 0 12 % solution Apply 15 mL to the mouth or throat 2 (two) times a day, Starting Sat 8/6/2022, Normal      Diclofenac Sodium (VOLTAREN) 1 % Apply 2 g topically 4 (four) times a day for 13 days, Starting Sat 3/19/2022, Until Fri 4/1/2022, Normal      lidocaine (Lidoderm) 5 % Apply 1 patch topically daily for 6 days Remove & Discard patch within 12 hours or as directed by MD, Starting Mon 10/17/2022, Until Sun 10/23/2022, Normal             No discharge procedures on file      PDMP Review     None          ED Provider  Electronically Signed by           Shweta Grayson,   06/05/23 1545

## 2023-07-13 ENCOUNTER — APPOINTMENT (OUTPATIENT)
Dept: LAB | Facility: MEDICAL CENTER | Age: 33
End: 2023-07-13

## 2023-07-13 DIAGNOSIS — Z02.1 PRE-EMPLOYMENT HEALTH SCREENING EXAMINATION: ICD-10-CM

## 2023-07-13 PROCEDURE — 36415 COLL VENOUS BLD VENIPUNCTURE: CPT

## 2023-07-13 PROCEDURE — 86480 TB TEST CELL IMMUN MEASURE: CPT

## 2023-07-17 LAB
GAMMA INTERFERON BACKGROUND BLD IA-ACNC: 0.09 IU/ML
M TB IFN-G BLD-IMP: NEGATIVE
M TB IFN-G CD4+ BCKGRND COR BLD-ACNC: 0 IU/ML
M TB IFN-G CD4+ BCKGRND COR BLD-ACNC: 0.01 IU/ML
MITOGEN IGNF BCKGRD COR BLD-ACNC: >10 IU/ML

## 2023-10-17 ENCOUNTER — HOSPITAL ENCOUNTER (EMERGENCY)
Facility: HOSPITAL | Age: 33
Discharge: HOME | End: 2023-10-17
Attending: EMERGENCY MEDICINE

## 2023-10-17 VITALS
WEIGHT: 293 LBS | TEMPERATURE: 98.4 F | HEART RATE: 92 BPM | BODY MASS INDEX: 48.82 KG/M2 | RESPIRATION RATE: 18 BRPM | HEIGHT: 65 IN | OXYGEN SATURATION: 97 % | DIASTOLIC BLOOD PRESSURE: 72 MMHG | SYSTOLIC BLOOD PRESSURE: 111 MMHG

## 2023-10-17 DIAGNOSIS — K05.219 GUM ABSCESS: ICD-10-CM

## 2023-10-17 DIAGNOSIS — K02.9 DENTAL CARIES: Primary | ICD-10-CM

## 2023-10-17 PROCEDURE — 99284 EMERGENCY DEPT VISIT MOD MDM: CPT | Performed by: PHYSICIAN ASSISTANT

## 2023-10-17 PROCEDURE — 99282 EMERGENCY DEPT VISIT SF MDM: CPT

## 2023-10-17 RX ORDER — IBUPROFEN 600 MG/1
600 TABLET ORAL EVERY 6 HOURS PRN
Qty: 20 TABLET | Refills: 0 | Status: SHIPPED | OUTPATIENT
Start: 2023-10-17

## 2023-10-17 RX ORDER — AMOXICILLIN AND CLAVULANATE POTASSIUM 875; 125 MG/1; MG/1
1 TABLET, FILM COATED ORAL EVERY 12 HOURS
Qty: 14 TABLET | Refills: 0 | Status: SHIPPED | OUTPATIENT
Start: 2023-10-17 | End: 2023-10-24

## 2023-10-17 RX ORDER — AMOXICILLIN AND CLAVULANATE POTASSIUM 875; 125 MG/1; MG/1
1 TABLET, FILM COATED ORAL EVERY 12 HOURS
Qty: 14 TABLET | Refills: 0 | Status: SHIPPED | OUTPATIENT
Start: 2023-10-17 | End: 2023-10-17 | Stop reason: SDUPTHER

## 2023-10-17 RX ORDER — IBUPROFEN 600 MG/1
600 TABLET ORAL EVERY 6 HOURS PRN
Qty: 20 TABLET | Refills: 0 | Status: SHIPPED | OUTPATIENT
Start: 2023-10-17 | End: 2023-10-17 | Stop reason: SDUPTHER

## 2023-10-17 NOTE — ED PROVIDER NOTES
History  Chief Complaint   Patient presents with    Dental Pain     [address]ia [address] w/complaint left upper dental pain/abscess; states had an appointment to have tooth pulled "but I missed it and now have to reschedule"; taking advil w/some relief; denies N/V, fever     [address]ia [facility] w/complaint left upper dental pain/abscess; states had an appointment to have tooth pulled "but I missed it and now have to reschedule"; taking advil w/some relief; denies N/V, fever      Patient is a 72-year-old female who presents for evaluation of dental pain. Patient states that she had a root canal on a left rear molar which developed 2 years ago. Patient states that since then, she has had intermittent issues with dental infections. Patient states that over the past few days, she has had worsening pain in the left upper and lower molars. Patient states that she does have known cavities in these areas. Patient has been taking Tylenol and ibuprofen without significant relief in symptoms. Patient denies any facial swelling, drooling, difficulty swallowing, difficulty breathing, or trismus. She denies any associated fever chills. Patient states that she has had these infections before, states that they typically resolve with oral antibiotics. Prior to Admission Medications   Prescriptions Last Dose Informant Patient Reported? Taking? Diclofenac Sodium (VOLTAREN) 1 %   No No   Sig: Apply 2 g topically 4 (four) times a day for 13 days   chlorhexidine (PERIDEX) 0.12 % solution   No No   Sig: Apply 15 mL to the mouth or throat 2 (two) times a day   fluticasone (FLONASE) 50 mcg/act nasal spray   No No   Si spray into each nostril daily   lidocaine (Lidoderm) 5 %   No No   Sig: Apply 1 patch topically daily for 6 days Remove & Discard patch within 12 hours or as directed by MD      Facility-Administered Medications: None       History reviewed. No pertinent past medical history. History reviewed.  No pertinent surgical history. History reviewed. No pertinent family history. I have reviewed and agree with the history as documented. E-Cigarette/Vaping    E-Cigarette Use Never User      E-Cigarette/Vaping Substances    Nicotine No     THC No     CBD No     Flavoring No     Other No     Unknown No      Social History     Tobacco Use    Smoking status: Every Day     Packs/day: 0.25     Types: Cigarettes    Smokeless tobacco: Never   Vaping Use    Vaping Use: Never used   Substance Use Topics    Alcohol use: Not Currently    Drug use: Yes     Types: Marijuana     Comment: daily       Review of Systems    Physical Exam  Physical Exam  Vitals and nursing note reviewed. Constitutional:       General: She is not in acute distress. Appearance: She is well-developed. She is obese. HENT:      Head: Normocephalic and atraumatic. Right Ear: External ear normal.      Left Ear: External ear normal.      Nose: Nose normal.      Mouth/Throat:      Mouth: Mucous membranes are moist.      Dentition: Abnormal dentition. Dental tenderness, dental caries and dental abscesses present. Pharynx: Oropharynx is clear. Comments: + broken, decaying left upper back molar (circled) with + draining abscess noted to lateral gum adjacent to broken tooth. Displaced, Extra tooth noted medial to decaying tooth involved,   Eyes:      Conjunctiva/sclera: Conjunctivae normal.   Cardiovascular:      Rate and Rhythm: Normal rate and regular rhythm. Pulmonary:      Effort: Pulmonary effort is normal.      Breath sounds: Normal breath sounds. Abdominal:      General: Bowel sounds are normal.      Palpations: Abdomen is soft. Musculoskeletal:         General: Normal range of motion. Cervical back: Normal range of motion. Skin:     General: Skin is warm and dry. Neurological:      Mental Status: She is alert and oriented to person, place, and time.    Psychiatric:         Behavior: Behavior normal.         Vital Signs  ED Triage Vitals   Temperature Pulse Respirations Blood Pressure SpO2   10/17/23 1317 10/17/23 1317 10/17/23 1317 10/17/23 1321 10/17/23 1317   98.4 °F (36.9 °C) 92 18 111/72 97 %      Temp Source Heart Rate Source Patient Position - Orthostatic VS BP Location FiO2 (%)   10/17/23 1317 10/17/23 1317 10/17/23 1317 10/17/23 1317 --   Oral Monitor Sitting Left arm       Pain Score       10/17/23 1317       6           Vitals:    10/17/23 1317 10/17/23 1321   BP:  111/72   Pulse: 92    Patient Position - Orthostatic VS: Sitting          Visual Acuity      ED Medications  Medications - No data to display    Diagnostic Studies  Results Reviewed       None                   No orders to display              Procedures  Procedures         ED Course                               SBIRT 22yo+      Flowsheet Row Most Recent Value   Initial Alcohol Screen: US AUDIT-C     1. How often do you have a drink containing alcohol? 0 Filed at: 10/17/2023 1319   2. How many drinks containing alcohol do you have on a typical day you are drinking? 0 Filed at: 10/17/2023 1319   3b. FEMALE Any Age, or MALE 65+: How often do you have 4 or more drinks on one occassion? 0 Filed at: 10/17/2023 1319   Audit-C Score 0 Filed at: 10/17/2023 1319   YASIR: How many times in the past year have you. .. Used an illegal drug or used a prescription medication for non-medical reasons? Never Filed at: 10/17/2023 1319                      Medical Decision Making  Risk  Prescription drug management.              Disposition  Final diagnoses:   Dental caries   Gum abscess     Time reflects when diagnosis was documented in both MDM as applicable and the Disposition within this note       Time User Action Codes Description Comment    10/17/2023  2:11 PM Fabiana Agosto Add [K02.9] Dental caries     10/17/2023  2:11 PM Fabiana Agosto Add [A99.850] Gum abscess           ED Disposition       ED Disposition   Discharge    Condition   Stable    Date/Time   Tue Oct 17, 2023 1490 Comment   Stephan Flies discharge to home/self care. Follow-up Information       Follow up With Specialties Details Why Contact Info    [facility]    [facility] [address] [facility] [address]            Discharge Medication List as of 10/17/2023  2:14 PM        START taking these medications    Details   amoxicillin-clavulanate (AUGMENTIN) 875-125 mg per tablet Take 1 tablet by mouth every 12 (twelve) hours for 7 days, Starting Tue 10/17/2023, Until Tue 10/24/2023, Normal      ibuprofen (MOTRIN) 600 mg tablet Take 1 tablet (600 mg total) by mouth every 6 (six) hours as needed for mild pain, Starting Tue 10/17/2023, Normal           CONTINUE these medications which have NOT CHANGED    Details   chlorhexidine (PERIDEX) 0.12 % solution Apply 15 mL to the mouth or throat 2 (two) times a day, Starting Sat 8/6/2022, Normal      Diclofenac Sodium (VOLTAREN) 1 % Apply 2 g topically 4 (four) times a day for 13 days, Starting Sat 3/19/2022, Until Fri 4/1/2022, Normal      fluticasone (FLONASE) 50 mcg/act nasal spray 1 spray into each nostril daily, Starting Mon 6/5/2023, Normal      lidocaine (Lidoderm) 5 % Apply 1 patch topically daily for 6 days Remove & Discard patch within 12 hours or as directed by MD, Starting Mon 10/17/2022, Until Sun 10/23/2022, Normal             No discharge procedures on file.     PDMP Review       None            ED Provider  Electronically Signed by             Bravo Singh PA-C  10/17/23 1957

## 2023-10-17 NOTE — DISCHARGE INSTRUCTIONS
Use Tylenol every 4 hours or Motrin every 6 hours; you can alternate the 2 medications taking something every 3 hours for pain or fever. Take all oral antibiotics until done. Rinse with warm, salt water to promote drainage.     Follow-up with dentist.

## 2023-10-17 NOTE — ED TRIAGE NOTES
[address]ia [address] w/complaint left upper dental pain/abscess; states had an appointment to have tooth pulled "but I missed it and now have to reschedule"; taking advil w/some relief; denies N/V, fever

## 2023-10-17 NOTE — ED NOTES
D/c reviewed with pt. Pt verbalized understanding and has no further questions at this time. Pt ambulatory off unit with steady gait.      Lazarus Course, WAYNE  10/17/23 5555